# Patient Record
Sex: MALE | Race: WHITE | NOT HISPANIC OR LATINO | Employment: FULL TIME | URBAN - METROPOLITAN AREA
[De-identification: names, ages, dates, MRNs, and addresses within clinical notes are randomized per-mention and may not be internally consistent; named-entity substitution may affect disease eponyms.]

---

## 2023-02-15 PROBLEM — K21.9 GERD (GASTROESOPHAGEAL REFLUX DISEASE): Status: ACTIVE | Noted: 2023-02-15

## 2023-02-15 PROBLEM — R73.01 ELEVATED FASTING GLUCOSE: Status: ACTIVE | Noted: 2023-02-15

## 2023-02-15 PROBLEM — K57.90 DIVERTICULOSIS: Status: ACTIVE | Noted: 2023-02-15

## 2023-02-15 PROBLEM — K22.10 EROSIVE ESOPHAGITIS: Status: ACTIVE | Noted: 2023-02-15

## 2023-02-15 PROBLEM — E78.5 HYPERLIPIDEMIA: Status: ACTIVE | Noted: 2023-02-15

## 2023-02-15 PROBLEM — H90.3 ASYMMETRIC SNHL (SENSORINEURAL HEARING LOSS): Status: ACTIVE | Noted: 2023-02-15

## 2023-02-15 PROBLEM — H81.09 MENIERE SYNDROME: Status: ACTIVE | Noted: 2023-02-15

## 2023-02-15 PROBLEM — H91.10 PRESBYCUSIS: Status: ACTIVE | Noted: 2023-02-15

## 2023-02-15 PROBLEM — R79.9 ELEVATED BUN: Status: ACTIVE | Noted: 2023-02-15

## 2023-02-15 PROBLEM — K22.70 BARRETT'S ESOPHAGUS: Status: ACTIVE | Noted: 2023-02-15

## 2023-02-15 PROBLEM — H81.10 BENIGN POSITIONAL VERTIGO: Status: ACTIVE | Noted: 2023-02-15

## 2023-02-15 PROBLEM — R06.83 SNORING: Status: ACTIVE | Noted: 2023-02-15

## 2023-02-15 PROBLEM — K63.5 COLON POLYP: Status: ACTIVE | Noted: 2023-02-15

## 2023-02-15 PROBLEM — D12.6 TUBULAR ADENOMA OF COLON: Status: ACTIVE | Noted: 2023-02-15

## 2023-02-15 PROBLEM — R42 VERTIGO: Status: ACTIVE | Noted: 2023-02-15

## 2023-02-15 PROBLEM — E78.1 HYPERTRIGLYCERIDEMIA: Status: ACTIVE | Noted: 2023-02-15

## 2023-02-15 RX ORDER — CIPROFLOXACIN 500 MG/1
TABLET ORAL
COMMUNITY
Start: 2022-10-03 | End: 2023-06-12 | Stop reason: ALTCHOICE

## 2023-02-15 RX ORDER — EPINEPHRINE 0.3 MG/.3ML
0.3 INJECTION SUBCUTANEOUS
COMMUNITY
Start: 2018-04-30 | End: 2023-11-02 | Stop reason: SDUPTHER

## 2023-02-15 RX ORDER — FENOFIBRATE 54 MG/1
1 TABLET ORAL DAILY
COMMUNITY
Start: 2021-10-14 | End: 2023-04-12 | Stop reason: ALTCHOICE

## 2023-02-15 RX ORDER — OMEPRAZOLE 40 MG/1
1 CAPSULE, DELAYED RELEASE ORAL
COMMUNITY
Start: 2019-06-25 | End: 2023-05-03 | Stop reason: ALTCHOICE

## 2023-02-15 RX ORDER — MECLIZINE HYDROCHLORIDE 25 MG/1
TABLET ORAL
COMMUNITY
Start: 2017-11-20 | End: 2023-11-02 | Stop reason: ALTCHOICE

## 2023-02-15 RX ORDER — METRONIDAZOLE 500 MG/1
TABLET ORAL
COMMUNITY
Start: 2022-10-03 | End: 2023-11-02 | Stop reason: ALTCHOICE

## 2023-02-15 RX ORDER — TRIAMTERENE/HYDROCHLOROTHIAZID 37.5-25 MG
1 TABLET ORAL DAILY
COMMUNITY
Start: 2020-01-21 | End: 2023-11-08

## 2023-02-15 RX ORDER — ZOSTER VACCINE RECOMBINANT, ADJUVANTED 50 MCG/0.5
KIT INTRAMUSCULAR
COMMUNITY
Start: 2021-09-16 | End: 2023-11-02 | Stop reason: ALTCHOICE

## 2023-02-15 RX ORDER — ATORVASTATIN CALCIUM 40 MG/1
80 TABLET, FILM COATED ORAL DAILY
COMMUNITY
Start: 2017-10-23 | End: 2023-06-12 | Stop reason: ALTCHOICE

## 2023-02-20 RX ORDER — MECLIZINE HYDROCHLORIDE 25 MG/1
TABLET ORAL
COMMUNITY
End: 2023-11-02 | Stop reason: ALTCHOICE

## 2023-02-20 RX ORDER — OMEPRAZOLE 40 MG/1
40 CAPSULE, DELAYED RELEASE ORAL
COMMUNITY
End: 2023-05-03 | Stop reason: ALTCHOICE

## 2023-02-24 LAB
ANION GAP IN SER/PLAS: 16 MMOL/L (ref 10–20)
APPEARANCE, URINE: CLEAR
BILIRUBIN, URINE: NEGATIVE
BLOOD, URINE: NEGATIVE
CALCIDIOL (25 OH VITAMIN D3) (NG/ML) IN SER/PLAS: 32 NG/ML
CALCIUM (MG/DL) IN SER/PLAS: 10.1 MG/DL (ref 8.6–10.3)
CARBON DIOXIDE, TOTAL (MMOL/L) IN SER/PLAS: 29 MMOL/L (ref 21–32)
CHLORIDE (MMOL/L) IN SER/PLAS: 97 MMOL/L (ref 98–107)
COLOR, URINE: YELLOW
CREATININE (MG/DL) IN SER/PLAS: 1.5 MG/DL (ref 0.5–1.3)
CREATININE (MG/DL) IN URINE: 174 MG/DL (ref 20–370)
GFR MALE: 52 ML/MIN/1.73M2
GLUCOSE (MG/DL) IN SER/PLAS: 102 MG/DL (ref 74–99)
GLUCOSE, URINE: NEGATIVE MG/DL
KETONES, URINE: NEGATIVE MG/DL
LEUKOCYTE ESTERASE, URINE: NEGATIVE
NITRITE, URINE: NEGATIVE
PARATHYRIN INTACT (PG/ML) IN SER/PLAS: 47.6 PG/ML (ref 18.5–88)
PH, URINE: 5 (ref 5–8)
PHOSPHATE (MG/DL) IN SER/PLAS: 3.2 MG/DL (ref 2.5–4.9)
POTASSIUM (MMOL/L) IN SER/PLAS: 3.9 MMOL/L (ref 3.5–5.3)
PROTEIN (MG/DL) IN URINE: 17 MG/DL (ref 5–25)
PROTEIN, URINE: NEGATIVE MG/DL
PROTEIN/CREATININE (MG/MG) IN URINE: 0.1 MG/MG CREAT (ref 0–0.17)
SODIUM (MMOL/L) IN SER/PLAS: 138 MMOL/L (ref 136–145)
SPECIFIC GRAVITY, URINE: 1.02 (ref 1–1.03)
URATE (MG/DL) IN SER/PLAS: 8.1 MG/DL (ref 4–7.5)
UREA NITROGEN (MG/DL) IN SER/PLAS: 25 MG/DL (ref 6–23)
UROBILINOGEN, URINE: <2 MG/DL (ref 0–1.9)

## 2023-04-12 ENCOUNTER — OFFICE VISIT (OUTPATIENT)
Dept: PRIMARY CARE | Facility: CLINIC | Age: 63
End: 2023-04-12
Payer: COMMERCIAL

## 2023-04-12 VITALS
HEIGHT: 72 IN | BODY MASS INDEX: 20.34 KG/M2 | RESPIRATION RATE: 16 BRPM | WEIGHT: 150.2 LBS | HEART RATE: 72 BPM | SYSTOLIC BLOOD PRESSURE: 112 MMHG | DIASTOLIC BLOOD PRESSURE: 68 MMHG | TEMPERATURE: 97.8 F | OXYGEN SATURATION: 98 %

## 2023-04-12 DIAGNOSIS — E78.2 MIXED HYPERLIPIDEMIA: ICD-10-CM

## 2023-04-12 DIAGNOSIS — K22.10 EROSIVE ESOPHAGITIS: ICD-10-CM

## 2023-04-12 DIAGNOSIS — N18.31 CHRONIC RENAL FAILURE, STAGE 3A (MULTI): ICD-10-CM

## 2023-04-12 DIAGNOSIS — K21.9 GASTROESOPHAGEAL REFLUX DISEASE WITHOUT ESOPHAGITIS: ICD-10-CM

## 2023-04-12 DIAGNOSIS — G47.33 OBSTRUCTIVE SLEEP APNEA, ADULT: ICD-10-CM

## 2023-04-12 DIAGNOSIS — E79.0 HYPERURICEMIA: Primary | ICD-10-CM

## 2023-04-12 PROBLEM — M22.41 PATELLOFEMORAL CHONDROSIS, RIGHT: Status: ACTIVE | Noted: 2018-04-05

## 2023-04-12 PROBLEM — E78.6 ELEVATED RATIO OF CHOLESTEROL TO HIGH DENSITY LIPOPROTEIN (HDL): Status: RESOLVED | Noted: 2023-04-12 | Resolved: 2023-04-12

## 2023-04-12 PROBLEM — M47.816 LUMBAR SPONDYLOSIS: Status: ACTIVE | Noted: 2021-05-25

## 2023-04-12 PROBLEM — R42 VERTIGO: Status: RESOLVED | Noted: 2023-02-15 | Resolved: 2023-04-12

## 2023-04-12 PROBLEM — E78.6 ELEVATED RATIO OF CHOLESTEROL TO HIGH DENSITY LIPOPROTEIN (HDL): Status: ACTIVE | Noted: 2023-04-12

## 2023-04-12 PROBLEM — M47.816 LUMBAR SPONDYLOSIS: Status: RESOLVED | Noted: 2021-05-25 | Resolved: 2023-04-12

## 2023-04-12 PROBLEM — R06.83 SNORING: Status: RESOLVED | Noted: 2023-02-15 | Resolved: 2023-04-12

## 2023-04-12 PROBLEM — R79.9 ELEVATED BUN: Status: RESOLVED | Noted: 2023-02-15 | Resolved: 2023-04-12

## 2023-04-12 PROBLEM — M19.91 LOCALIZED, PRIMARY OSTEOARTHRITIS: Status: ACTIVE | Noted: 2021-05-25

## 2023-04-12 PROCEDURE — 1036F TOBACCO NON-USER: CPT | Performed by: INTERNAL MEDICINE

## 2023-04-12 PROCEDURE — 99214 OFFICE O/P EST MOD 30 MIN: CPT | Performed by: INTERNAL MEDICINE

## 2023-04-12 RX ORDER — ALLOPURINOL 100 MG/1
1 TABLET ORAL DAILY
COMMUNITY
Start: 2023-02-27 | End: 2024-02-27

## 2023-04-12 RX ORDER — UBIDECARENONE 30 MG
30 CAPSULE ORAL DAILY
Qty: 30 CAPSULE | Refills: 11 | Status: SHIPPED | OUTPATIENT
Start: 2023-04-12 | End: 2024-04-11

## 2023-04-12 ASSESSMENT — ENCOUNTER SYMPTOMS
CONSTIPATION: 0
DYSPHORIC MOOD: 0
PALPITATIONS: 0
ARTHRALGIAS: 0
CHEST TIGHTNESS: 0
VOMITING: 0
POLYDIPSIA: 0
SHORTNESS OF BREATH: 0
COUGH: 0
DIARRHEA: 0
FEVER: 0
UNEXPECTED WEIGHT CHANGE: 0
DIZZINESS: 0
NAUSEA: 0
NECK PAIN: 0
FREQUENCY: 0
HEADACHES: 0

## 2023-04-12 ASSESSMENT — PAIN SCALES - GENERAL: PAINLEVEL: 0-NO PAIN

## 2023-04-12 NOTE — PROGRESS NOTES
Anca Kowalski is a 62 y.o. male who presents for Follow-up (Review labs ).    Patient wants to discuss the dosage change of the atorvastatin, states that he is experiencing side effects    Feels better than ever.  Seen by Dr. Lynn and managing CKD.    Thinks statin is killing his Mojo since increasing his statin.        Review of Systems   Constitutional:  Negative for fever and unexpected weight change.   HENT:  Negative for congestion and ear pain.    Eyes:  Negative for visual disturbance.   Respiratory:  Negative for cough, chest tightness and shortness of breath.    Cardiovascular:  Negative for chest pain, palpitations and leg swelling.   Gastrointestinal:  Negative for constipation, diarrhea, nausea and vomiting.   Endocrine: Negative for polydipsia and polyuria.   Genitourinary:  Negative for frequency and urgency.   Musculoskeletal:  Negative for arthralgias and neck pain.   Skin:  Negative for rash.   Neurological:  Negative for dizziness and headaches.   Psychiatric/Behavioral:  Negative for dysphoric mood.    All other systems reviewed and are negative.      Objective   /68   Pulse 72   Temp 36.6 °C (97.8 °F)   Resp 16   Ht 1.829 m (6')   Wt 68.1 kg (150 lb 3.2 oz)   SpO2 98%   BMI 20.37 kg/m²       Physical Exam  Constitutional:       Appearance: Normal appearance.   HENT:      Head: Normocephalic.   Eyes:      Conjunctiva/sclera: Conjunctivae normal.   Cardiovascular:      Rate and Rhythm: Normal rate and regular rhythm.   Pulmonary:      Effort: Pulmonary effort is normal.      Breath sounds: Normal breath sounds.   Musculoskeletal:      Cervical back: Neck supple.   Skin:     General: Skin is warm and dry.   Neurological:      Mental Status: He is alert.         Assessment/Plan   Problem List Items Addressed This Visit          Nervous    Obstructive sleep apnea, adult    Relevant Medications    co-enzyme Q-10 30 mg capsule    Other Relevant Orders    Referral to  Cardiology       Digestive    Erosive esophagitis    Relevant Medications    co-enzyme Q-10 30 mg capsule    Other Relevant Orders    Referral to Cardiology    GERD (gastroesophageal reflux disease)    Relevant Medications    co-enzyme Q-10 30 mg capsule    Other Relevant Orders    Referral to Cardiology       Genitourinary    Chronic renal failure, stage 3a    Relevant Medications    co-enzyme Q-10 30 mg capsule    Other Relevant Orders    Referral to Cardiology       Other    Hyperlipidemia    Relevant Medications    co-enzyme Q-10 30 mg capsule    Other Relevant Orders    Referral to Cardiology    Hyperuricemia - Primary    Relevant Medications    co-enzyme Q-10 30 mg capsule    Other Relevant Orders    Referral to Cardiology     Encounter Diagnoses   Name Primary?    Hyperuricemia Yes    Mixed hyperlipidemia     Chronic renal failure, stage 3a     Gastroesophageal reflux disease without esophagitis     Erosive esophagitis     Obstructive sleep apnea, adult      Henry Steve, DO

## 2023-07-07 ENCOUNTER — LAB (OUTPATIENT)
Dept: LAB | Facility: LAB | Age: 63
End: 2023-07-07
Payer: COMMERCIAL

## 2023-07-07 DIAGNOSIS — E78.2 MIXED HYPERLIPIDEMIA: ICD-10-CM

## 2023-07-07 LAB
CHOLESTEROL (MG/DL) IN SER/PLAS: 119 MG/DL (ref 0–199)
CHOLESTEROL IN HDL (MG/DL) IN SER/PLAS: 33.1 MG/DL
CHOLESTEROL/HDL RATIO: 3.6
LDL: 65 MG/DL (ref 0–99)
TRIGLYCERIDE (MG/DL) IN SER/PLAS: 103 MG/DL (ref 0–149)
VLDL: 21 MG/DL (ref 0–40)

## 2023-07-07 PROCEDURE — 36415 COLL VENOUS BLD VENIPUNCTURE: CPT

## 2023-07-07 PROCEDURE — 80061 LIPID PANEL: CPT

## 2023-07-10 ENCOUNTER — OFFICE VISIT (OUTPATIENT)
Dept: PRIMARY CARE | Facility: CLINIC | Age: 63
End: 2023-07-10
Payer: COMMERCIAL

## 2023-07-10 VITALS
RESPIRATION RATE: 16 BRPM | SYSTOLIC BLOOD PRESSURE: 132 MMHG | DIASTOLIC BLOOD PRESSURE: 74 MMHG | BODY MASS INDEX: 26.17 KG/M2 | WEIGHT: 193.2 LBS | HEART RATE: 94 BPM | TEMPERATURE: 98 F | OXYGEN SATURATION: 96 % | HEIGHT: 72 IN

## 2023-07-10 DIAGNOSIS — I10 ESSENTIAL HYPERTENSION: ICD-10-CM

## 2023-07-10 DIAGNOSIS — G47.33 OBSTRUCTIVE SLEEP APNEA, ADULT: ICD-10-CM

## 2023-07-10 DIAGNOSIS — N18.31 CHRONIC RENAL FAILURE, STAGE 3A (MULTI): ICD-10-CM

## 2023-07-10 DIAGNOSIS — Z12.5 ENCOUNTER FOR PROSTATE CANCER SCREENING: ICD-10-CM

## 2023-07-10 DIAGNOSIS — E78.2 MIXED HYPERLIPIDEMIA: Primary | ICD-10-CM

## 2023-07-10 DIAGNOSIS — Z00.00 WELL ADULT HEALTH CHECK: ICD-10-CM

## 2023-07-10 DIAGNOSIS — K22.70 BARRETT'S ESOPHAGUS WITHOUT DYSPLASIA: ICD-10-CM

## 2023-07-10 DIAGNOSIS — E78.1 HYPERTRIGLYCERIDEMIA: ICD-10-CM

## 2023-07-10 PROBLEM — M1A.0710 IDIOPATHIC CHRONIC GOUT OF RIGHT FOOT WITHOUT TOPHUS: Status: ACTIVE | Noted: 2023-07-10

## 2023-07-10 PROBLEM — I49.3 PREMATURE VENTRICULAR CONTRACTIONS: Status: ACTIVE | Noted: 2023-07-10

## 2023-07-10 PROBLEM — I25.10 CAD (CORONARY ARTERY DISEASE): Status: ACTIVE | Noted: 2023-07-10

## 2023-07-10 PROBLEM — E79.0 ELEVATED URIC ACID IN BLOOD: Status: RESOLVED | Noted: 2023-04-12 | Resolved: 2023-07-10

## 2023-07-10 PROCEDURE — 3075F SYST BP GE 130 - 139MM HG: CPT | Performed by: INTERNAL MEDICINE

## 2023-07-10 PROCEDURE — 99213 OFFICE O/P EST LOW 20 MIN: CPT | Performed by: INTERNAL MEDICINE

## 2023-07-10 PROCEDURE — 1036F TOBACCO NON-USER: CPT | Performed by: INTERNAL MEDICINE

## 2023-07-10 PROCEDURE — 3078F DIAST BP <80 MM HG: CPT | Performed by: INTERNAL MEDICINE

## 2023-07-10 RX ORDER — RAMIPRIL 1.25 MG/1
1 CAPSULE ORAL DAILY
COMMUNITY
Start: 2023-05-19 | End: 2024-02-28

## 2023-07-10 ASSESSMENT — PAIN SCALES - GENERAL: PAINLEVEL: 0-NO PAIN

## 2023-07-10 NOTE — PROGRESS NOTES
Anca Kowalski is a 63 y.o. male who presents for Follow-up (Review lab and hypertension ) and stress test   Patient has no concerns today     Patient had stressed test down and was told it was normal   Reviewed Dr. Light recommendations and is exercising, and eating healthier.  16,000 steps per day.  Seeing Nephrology and Cardiology.        Review of Systems   All other systems reviewed and are negative.      Objective   /74   Pulse 94   Temp 36.7 °C (98 °F)   Resp 16   Ht 1.829 m (6')   Wt 87.6 kg (193 lb 3.2 oz)   SpO2 96%   BMI 26.20 kg/m²       Physical Exam  Constitutional:       Appearance: Normal appearance.   HENT:      Head: Normocephalic.   Eyes:      Conjunctiva/sclera: Conjunctivae normal.   Cardiovascular:      Rate and Rhythm: Normal rate and regular rhythm.   Pulmonary:      Effort: Pulmonary effort is normal.      Breath sounds: Normal breath sounds.   Musculoskeletal:      Cervical back: Neck supple.   Skin:     General: Skin is warm and dry.   Neurological:      Mental Status: He is alert.         Assessment/Plan   Problem List Items Addressed This Visit       Ennis's esophagus    Hyperlipidemia - Primary    Relevant Orders    TSH with reflex to Free T4 if abnormal    Hypertriglyceridemia    Chronic renal failure, stage 3a    Obstructive sleep apnea, adult     Nasal CPAP and tolerating well.         Relevant Orders    TSH with reflex to Free T4 if abnormal    Essential hypertension    Relevant Orders    TSH with reflex to Free T4 if abnormal     Other Visit Diagnoses       Encounter for prostate cancer screening        Relevant Orders    Prostate Specific Antigen    Well adult health check        Relevant Orders    Comprehensive Metabolic Panel    CBC    TSH with reflex to Free T4 if abnormal          Encounter Diagnoses   Name Primary?    Mixed hyperlipidemia Yes    Hypertriglyceridemia     Essential hypertension     Ennis's esophagus without dysplasia     Chronic  renal failure, stage 3a     Obstructive sleep apnea, adult     Encounter for prostate cancer screening     Well adult health check      Henry Steve DO

## 2023-07-19 ENCOUNTER — APPOINTMENT (OUTPATIENT)
Dept: PRIMARY CARE | Facility: CLINIC | Age: 63
End: 2023-07-19
Payer: COMMERCIAL

## 2023-08-25 PROBLEM — N18.9 CKD (CHRONIC KIDNEY DISEASE): Status: ACTIVE | Noted: 2023-08-25

## 2023-08-25 PROBLEM — Z91.030 BEE STING ALLERGY: Status: ACTIVE | Noted: 2023-08-25

## 2023-08-25 PROBLEM — K57.32 DIVERTICULITIS, COLON: Status: ACTIVE | Noted: 2023-08-25

## 2023-08-25 PROBLEM — I47.10 SUPRAVENTRICULAR TACHYCARDIA (CMS-HCC): Status: ACTIVE | Noted: 2023-08-25

## 2023-08-25 PROBLEM — K62.5 RECTAL BLEEDING: Status: ACTIVE | Noted: 2023-08-25

## 2023-08-25 PROBLEM — M79.671 RIGHT FOOT PAIN: Status: ACTIVE | Noted: 2023-08-25

## 2023-08-25 PROBLEM — Z91.89 10 YEAR RISK OF MI OR STROKE 7.5% OR GREATER: Status: ACTIVE | Noted: 2023-08-25

## 2023-08-25 RX ORDER — PSYLLIUM SEED
PACKET (EA) ORAL
COMMUNITY
End: 2024-04-24 | Stop reason: WASHOUT

## 2023-09-07 DIAGNOSIS — E78.2 MIXED HYPERLIPIDEMIA: ICD-10-CM

## 2023-09-07 RX ORDER — ATORVASTATIN CALCIUM 80 MG/1
TABLET, FILM COATED ORAL
Qty: 90 TABLET | Refills: 3 | Status: SHIPPED | OUTPATIENT
Start: 2023-09-07

## 2023-09-16 LAB
ANION GAP IN SER/PLAS: 14 MMOL/L (ref 10–20)
CALCIDIOL (25 OH VITAMIN D3) (NG/ML) IN SER/PLAS: 28 NG/ML
CALCIUM (MG/DL) IN SER/PLAS: 9.5 MG/DL (ref 8.6–10.3)
CARBON DIOXIDE, TOTAL (MMOL/L) IN SER/PLAS: 26 MMOL/L (ref 21–32)
CHLORIDE (MMOL/L) IN SER/PLAS: 99 MMOL/L (ref 98–107)
CREATININE (MG/DL) IN SER/PLAS: 1.41 MG/DL (ref 0.5–1.3)
GFR MALE: 56 ML/MIN/1.73M2
GLUCOSE (MG/DL) IN SER/PLAS: 89 MG/DL (ref 74–99)
PARATHYRIN INTACT (PG/ML) IN SER/PLAS: 56.3 PG/ML (ref 18.5–88)
PHOSPHATE (MG/DL) IN SER/PLAS: 3.8 MG/DL (ref 2.5–4.9)
POTASSIUM (MMOL/L) IN SER/PLAS: 4.2 MMOL/L (ref 3.5–5.3)
SODIUM (MMOL/L) IN SER/PLAS: 135 MMOL/L (ref 136–145)
URATE (MG/DL) IN SER/PLAS: 7 MG/DL (ref 4–7.5)
UREA NITROGEN (MG/DL) IN SER/PLAS: 37 MG/DL (ref 6–23)

## 2023-10-02 PROBLEM — E55.9 VITAMIN D DEFICIENCY: Status: ACTIVE | Noted: 2023-10-02

## 2023-10-02 RX ORDER — ERGOCALCIFEROL 1.25 MG/1
1 CAPSULE ORAL
COMMUNITY
Start: 2023-09-18

## 2023-10-04 ENCOUNTER — OFFICE VISIT (OUTPATIENT)
Dept: OTOLARYNGOLOGY | Facility: CLINIC | Age: 63
End: 2023-10-04
Payer: COMMERCIAL

## 2023-10-04 ENCOUNTER — CLINICAL SUPPORT (OUTPATIENT)
Dept: AUDIOLOGY | Facility: CLINIC | Age: 63
End: 2023-10-04
Payer: COMMERCIAL

## 2023-10-04 VITALS — BODY MASS INDEX: 25.05 KG/M2 | HEIGHT: 73 IN | WEIGHT: 189 LBS

## 2023-10-04 DIAGNOSIS — H90.3 ASYMMETRIC SNHL (SENSORINEURAL HEARING LOSS): Primary | ICD-10-CM

## 2023-10-04 DIAGNOSIS — H81.02 MENIERE'S DISEASE OF LEFT EAR: Primary | ICD-10-CM

## 2023-10-04 DIAGNOSIS — H90.42 SENSORINEURAL HEARING LOSS (SNHL) OF LEFT EAR WITH UNRESTRICTED HEARING OF RIGHT EAR: ICD-10-CM

## 2023-10-04 DIAGNOSIS — H81.02 MENIERE'S DISEASE OF LEFT EAR: ICD-10-CM

## 2023-10-04 PROCEDURE — 92567 TYMPANOMETRY: CPT | Performed by: AUDIOLOGIST

## 2023-10-04 PROCEDURE — 99213 OFFICE O/P EST LOW 20 MIN: CPT | Performed by: OTOLARYNGOLOGY

## 2023-10-04 PROCEDURE — 1036F TOBACCO NON-USER: CPT | Performed by: OTOLARYNGOLOGY

## 2023-10-04 PROCEDURE — 92557 COMPREHENSIVE HEARING TEST: CPT | Performed by: AUDIOLOGIST

## 2023-10-04 NOTE — PROGRESS NOTES
The patient returns.  We are seeing him back today surveillance recheck history of Ménière's disease.  He has only had 1 slight episode of vertigo which he relates to eating too much salt.  He has been evaluated by cardiology as well as renal.  He has been told to eat no salt whatsoever.  He is otherwise doing great.  He does think his hearing is further declining in that left ear.    There have been no significant changes in past medical or past surgical histories except as mentioned.      Physical exam:  No acute distress.  The external ear structures appear normal. The ear canals patent and the tympanic membranes are intact without evidence of air-fluid levels, retraction, or congenital defects.  Anterior rhinoscopy notes essentially a midline nasal septum. Examination is noted for normal healthy mucosal membranes without any evidence of lesions, polyps, or exudate. The tongue is normally mobile. There are no lesions on the gingiva, buccal, or oral mucosa. There are no oral cavity masses.  The neck is negative for mass lymphadenopathy. The trachea and parotid are clear. The thyroid bed is grossly unremarkable. The salivary gland structures are grossly unremarkable.    Audiogram: Stable low-frequency loss down to 35 dB left with associated high-frequency loss left ear only down to 60 dB.  This is stable again.    Assessment and plan:  1.  History of Ménière's disease overall stable.  We have agreed to continue the diuretic for now.  He already has at prescription provided for him.  Recheck in 1 year sooner with issue.  2.  Overall stable unilateral hearing loss to the left ear as described.  Favor observation.  May consider amplification strategies at his leisure.  All questions were answered in this regard accordingly.

## 2023-10-04 NOTE — PROGRESS NOTES
Mr. Kowalski returned today for a hearing evaluation in conjunction with his follow up with Dr. Martinez.  The patient has a history of Meniere's disease and hearing loss left greater than right.  He has been doing quite well symptomatically with appropriate accommodations to his diet and only experiences more mild Meniere's flare ups 1-2 per year.  He did report possible concern for declined hearing with an echo sensation in his left ear.    Results:  Otoscopy revealed clear ear canals and tympanic membranes were visualized bilaterally.  Tympanometry revealed Type A sub d tympanograms, indicating normal ear canal volume and peak pressure with increased compliance/mobility bilaterally.  Audiometric thresholds revealed a a slight to mild sensorineural hearing loss in his right ear and a mild sloping to moderate sensorineural hearing loss in his left ear.  Word recognition scores excellent bilaterally.  Hearing levels have remained essentially stable as compared to his last evaluation in 2021.    Recommendations:  Follow-up with PCP, Dr. Steve, as medically directed.  Follow-up with ENT, Dr. Martinez, as medically directed.  Consider amplification for left ear.  Retest hearing annually to monitor or sooner should concerns for a change arise.

## 2023-11-01 ENCOUNTER — LAB (OUTPATIENT)
Dept: LAB | Facility: LAB | Age: 63
End: 2023-11-01
Payer: COMMERCIAL

## 2023-11-01 DIAGNOSIS — Z00.00 WELL ADULT HEALTH CHECK: ICD-10-CM

## 2023-11-01 DIAGNOSIS — E78.2 MIXED HYPERLIPIDEMIA: ICD-10-CM

## 2023-11-01 DIAGNOSIS — I10 ESSENTIAL HYPERTENSION: ICD-10-CM

## 2023-11-01 DIAGNOSIS — Z12.5 ENCOUNTER FOR PROSTATE CANCER SCREENING: ICD-10-CM

## 2023-11-01 DIAGNOSIS — G47.33 OBSTRUCTIVE SLEEP APNEA, ADULT: ICD-10-CM

## 2023-11-01 LAB
ALBUMIN SERPL BCP-MCNC: 3.8 G/DL (ref 3.4–5)
ALP SERPL-CCNC: 80 U/L (ref 33–136)
ALT SERPL W P-5'-P-CCNC: 21 U/L (ref 10–52)
ANION GAP SERPL CALC-SCNC: 12 MMOL/L (ref 10–20)
AST SERPL W P-5'-P-CCNC: 25 U/L (ref 9–39)
BILIRUB SERPL-MCNC: 0.4 MG/DL (ref 0–1.2)
BUN SERPL-MCNC: 17 MG/DL (ref 6–23)
CALCIUM SERPL-MCNC: 8.9 MG/DL (ref 8.6–10.3)
CHLORIDE SERPL-SCNC: 102 MMOL/L (ref 98–107)
CO2 SERPL-SCNC: 28 MMOL/L (ref 21–32)
CREAT SERPL-MCNC: 1.48 MG/DL (ref 0.5–1.3)
ERYTHROCYTE [DISTWIDTH] IN BLOOD BY AUTOMATED COUNT: 13.3 % (ref 11.5–14.5)
GFR SERPL CREATININE-BSD FRML MDRD: 53 ML/MIN/1.73M*2
GLUCOSE SERPL-MCNC: 115 MG/DL (ref 74–99)
HCT VFR BLD AUTO: 36.8 % (ref 41–52)
HGB BLD-MCNC: 11.7 G/DL (ref 13.5–17.5)
MCH RBC QN AUTO: 29.4 PG (ref 26–34)
MCHC RBC AUTO-ENTMCNC: 31.8 G/DL (ref 32–36)
MCV RBC AUTO: 93 FL (ref 80–100)
NRBC BLD-RTO: 0 /100 WBCS (ref 0–0)
PLATELET # BLD AUTO: 366 X10*3/UL (ref 150–450)
PMV BLD AUTO: 9.3 FL (ref 7.5–11.5)
POTASSIUM SERPL-SCNC: 4.1 MMOL/L (ref 3.5–5.3)
PROT SERPL-MCNC: 6.5 G/DL (ref 6.4–8.2)
PSA SERPL-MCNC: 1.43 NG/ML
RBC # BLD AUTO: 3.98 X10*6/UL (ref 4.5–5.9)
SODIUM SERPL-SCNC: 138 MMOL/L (ref 136–145)
TSH SERPL-ACNC: 1.24 MIU/L (ref 0.44–3.98)
WBC # BLD AUTO: 8.8 X10*3/UL (ref 4.4–11.3)

## 2023-11-01 PROCEDURE — 85027 COMPLETE CBC AUTOMATED: CPT

## 2023-11-01 PROCEDURE — 36415 COLL VENOUS BLD VENIPUNCTURE: CPT

## 2023-11-01 PROCEDURE — 84153 ASSAY OF PSA TOTAL: CPT

## 2023-11-01 PROCEDURE — 84443 ASSAY THYROID STIM HORMONE: CPT

## 2023-11-01 PROCEDURE — 80053 COMPREHEN METABOLIC PANEL: CPT

## 2023-11-02 ENCOUNTER — OFFICE VISIT (OUTPATIENT)
Dept: PRIMARY CARE | Facility: CLINIC | Age: 63
End: 2023-11-02
Payer: COMMERCIAL

## 2023-11-02 ENCOUNTER — LAB (OUTPATIENT)
Dept: LAB | Facility: LAB | Age: 63
End: 2023-11-02
Payer: COMMERCIAL

## 2023-11-02 ENCOUNTER — TELEPHONE (OUTPATIENT)
Dept: GASTROENTEROLOGY | Facility: CLINIC | Age: 63
End: 2023-11-02

## 2023-11-02 VITALS
TEMPERATURE: 97.8 F | OXYGEN SATURATION: 96 % | HEIGHT: 73 IN | SYSTOLIC BLOOD PRESSURE: 129 MMHG | BODY MASS INDEX: 25.66 KG/M2 | HEART RATE: 83 BPM | RESPIRATION RATE: 16 BRPM | DIASTOLIC BLOOD PRESSURE: 87 MMHG | WEIGHT: 193.6 LBS

## 2023-11-02 DIAGNOSIS — K22.70 BARRETT'S ESOPHAGUS WITHOUT DYSPLASIA: ICD-10-CM

## 2023-11-02 DIAGNOSIS — K62.5 RECTAL BLEEDING: ICD-10-CM

## 2023-11-02 DIAGNOSIS — K57.90 DIVERTICULOSIS: ICD-10-CM

## 2023-11-02 DIAGNOSIS — E55.9 VITAMIN D DEFICIENCY: ICD-10-CM

## 2023-11-02 DIAGNOSIS — E78.2 MIXED HYPERLIPIDEMIA: ICD-10-CM

## 2023-11-02 DIAGNOSIS — N18.2 STAGE 2 CHRONIC KIDNEY DISEASE: ICD-10-CM

## 2023-11-02 DIAGNOSIS — K62.5 RECTAL BLEEDING: Primary | ICD-10-CM

## 2023-11-02 DIAGNOSIS — R73.01 ELEVATED FASTING GLUCOSE: ICD-10-CM

## 2023-11-02 DIAGNOSIS — H81.02 MENIERE'S DISEASE OF LEFT EAR: ICD-10-CM

## 2023-11-02 DIAGNOSIS — Z91.030 BEE STING ALLERGY: ICD-10-CM

## 2023-11-02 DIAGNOSIS — G47.33 OBSTRUCTIVE SLEEP APNEA, ADULT: ICD-10-CM

## 2023-11-02 DIAGNOSIS — N18.31 CHRONIC RENAL FAILURE, STAGE 3A (MULTI): ICD-10-CM

## 2023-11-02 DIAGNOSIS — I10 ESSENTIAL HYPERTENSION: ICD-10-CM

## 2023-11-02 DIAGNOSIS — K21.9 GASTROESOPHAGEAL REFLUX DISEASE WITHOUT ESOPHAGITIS: ICD-10-CM

## 2023-11-02 DIAGNOSIS — D64.9 ANEMIA, UNSPECIFIED TYPE: ICD-10-CM

## 2023-11-02 PROBLEM — N18.9 CKD (CHRONIC KIDNEY DISEASE): Status: RESOLVED | Noted: 2023-08-25 | Resolved: 2023-11-02

## 2023-11-02 PROBLEM — M19.91 LOCALIZED, PRIMARY OSTEOARTHRITIS: Status: RESOLVED | Noted: 2021-05-25 | Resolved: 2023-11-02

## 2023-11-02 PROBLEM — I49.3 PREMATURE VENTRICULAR CONTRACTIONS: Status: RESOLVED | Noted: 2023-07-10 | Resolved: 2023-11-02

## 2023-11-02 PROBLEM — K57.32 DIVERTICULITIS, COLON: Status: RESOLVED | Noted: 2023-08-25 | Resolved: 2023-11-02

## 2023-11-02 PROBLEM — I25.10 CAD (CORONARY ARTERY DISEASE): Status: RESOLVED | Noted: 2023-07-10 | Resolved: 2023-11-02

## 2023-11-02 PROBLEM — I47.10 SUPRAVENTRICULAR TACHYCARDIA (CMS-HCC): Status: RESOLVED | Noted: 2023-08-25 | Resolved: 2023-11-02

## 2023-11-02 PROBLEM — M79.671 RIGHT FOOT PAIN: Status: RESOLVED | Noted: 2023-08-25 | Resolved: 2023-11-02

## 2023-11-02 PROBLEM — M1A.0710 IDIOPATHIC CHRONIC GOUT OF RIGHT FOOT WITHOUT TOPHUS: Status: RESOLVED | Noted: 2023-07-10 | Resolved: 2023-11-02

## 2023-11-02 PROBLEM — H81.10 BENIGN POSITIONAL VERTIGO: Status: RESOLVED | Noted: 2023-02-15 | Resolved: 2023-11-02

## 2023-11-02 PROBLEM — M22.41 PATELLOFEMORAL CHONDROSIS, RIGHT: Status: RESOLVED | Noted: 2018-04-05 | Resolved: 2023-11-02

## 2023-11-02 PROBLEM — H91.10 PRESBYCUSIS: Status: RESOLVED | Noted: 2023-02-15 | Resolved: 2023-11-02

## 2023-11-02 LAB
CRP SERPL-MCNC: 1.82 MG/DL
FOLATE SERPL-MCNC: 16.8 NG/ML
HGB RETIC QN: 32 PG (ref 28–38)
IMMATURE RETIC FRACTION: 16.5 %
IRON SATN MFR SERPL: 17 % (ref 25–45)
IRON SERPL-MCNC: 57 UG/DL (ref 35–150)
POC HEMOGLOBIN A1C: 5.5 % (ref 4.2–6.5)
RETICS #: 0.09 X10*6/UL (ref 0.02–0.12)
RETICS/RBC NFR AUTO: 2.3 % (ref 0.5–2)
TIBC SERPL-MCNC: 326 UG/DL (ref 240–445)
UIBC SERPL-MCNC: 269 UG/DL (ref 110–370)
VIT B12 SERPL-MCNC: 855 PG/ML (ref 211–911)

## 2023-11-02 PROCEDURE — 85045 AUTOMATED RETICULOCYTE COUNT: CPT

## 2023-11-02 PROCEDURE — 86140 C-REACTIVE PROTEIN: CPT

## 2023-11-02 PROCEDURE — 3079F DIAST BP 80-89 MM HG: CPT | Performed by: INTERNAL MEDICINE

## 2023-11-02 PROCEDURE — 83550 IRON BINDING TEST: CPT

## 2023-11-02 PROCEDURE — 36415 COLL VENOUS BLD VENIPUNCTURE: CPT

## 2023-11-02 PROCEDURE — 1036F TOBACCO NON-USER: CPT | Performed by: INTERNAL MEDICINE

## 2023-11-02 PROCEDURE — 82607 VITAMIN B-12: CPT

## 2023-11-02 PROCEDURE — 82728 ASSAY OF FERRITIN: CPT

## 2023-11-02 PROCEDURE — 83036 HEMOGLOBIN GLYCOSYLATED A1C: CPT | Performed by: INTERNAL MEDICINE

## 2023-11-02 PROCEDURE — 99396 PREV VISIT EST AGE 40-64: CPT | Performed by: INTERNAL MEDICINE

## 2023-11-02 PROCEDURE — 82746 ASSAY OF FOLIC ACID SERUM: CPT

## 2023-11-02 PROCEDURE — 3074F SYST BP LT 130 MM HG: CPT | Performed by: INTERNAL MEDICINE

## 2023-11-02 PROCEDURE — 83540 ASSAY OF IRON: CPT

## 2023-11-02 RX ORDER — CHOLECALCIFEROL (VITAMIN D3) 50 MCG
50 TABLET ORAL DAILY
Qty: 30 TABLET | Refills: 11 | Status: SHIPPED | OUTPATIENT
Start: 2023-11-02 | End: 2024-04-24 | Stop reason: WASHOUT

## 2023-11-02 RX ORDER — EPINEPHRINE 0.3 MG/.3ML
0.3 INJECTION SUBCUTANEOUS ONCE AS NEEDED
Qty: 1 EACH | Refills: 1 | Status: SHIPPED | OUTPATIENT
Start: 2023-11-02

## 2023-11-02 RX ORDER — DOCUSATE SODIUM 100 MG/1
100 CAPSULE, LIQUID FILLED ORAL 2 TIMES DAILY
Qty: 60 CAPSULE | Refills: 0 | Status: SHIPPED | OUTPATIENT
Start: 2023-11-02 | End: 2024-04-24 | Stop reason: WASHOUT

## 2023-11-02 ASSESSMENT — PAIN SCALES - GENERAL: PAINLEVEL: 0-NO PAIN

## 2023-11-02 NOTE — PROGRESS NOTES
"Anca Kowalski is a 63 y.o. male who presents for Annual Exam.    Patient already got his yearly flu vaccine   Well Adult Physical   Patient here for a comprehensive physical exam.The patient reports problems - gastro issues first episode was in April and then went away until July, patient had blood and pain- Patient went back to taken his fiber again, then it went away- then traveled to [place] and started getting blood again so started fiber again so it went away again  Patient is still having a little blood from his trip to [place] a couple weeks ago   Patient is very concerned on why this is still continuing to happen and his gastro Dr has not gotten back to him.  GI doctor added fibre supplement 3 x per day and had another episode in July.  Had blood and LLQ pain again.    Called here and went to the ER.      Went to [place] and again in [place] for a [employer] game.  Had taken the cleans both trips due to constipation and bleeding.  Was miserable.      Do you take any herbs or supplements that were not prescribed by a doctor? yes   Are you taking calcium supplements? no   Are you taking aspirin daily? no     History:  Patient receives prostate care outside our clinic  Date last PSA: 11.01.2023  Last colonoscopy was 12.29.2022            Review of Systems    Objective   /87   Pulse 83   Temp 36.6 °C (97.8 °F)   Resp 16   Ht 1.854 m (6' 1\")   Wt 87.8 kg (193 lb 9.6 oz)   SpO2 96%   BMI 25.54 kg/m²       Physical Exam    Assessment/Plan   Problem List Items Addressed This Visit       Diverticulosis    Relevant Medications    docusate sodium (Colace) 100 mg capsule    Elevated fasting glucose    Relevant Orders    POCT glycosylated hemoglobin (Hb A1C) manually resulted (Completed)    Ennis's esophagus    Relevant Orders    C-reactive protein (Completed)    GERD (gastroesophageal reflux disease)    Hyperlipidemia    Meniere syndrome    Chronic renal failure, stage 3a (CMS/HCC)    Relevant " Orders    Reticulocytes (Completed)    Ferritin (Completed)    Iron and TIBC (Completed)    Vitamin B12 (Completed)    Folate (Completed)    Obstructive sleep apnea, adult    Essential hypertension    Bee sting allergy    Relevant Medications    EPINEPHrine 0.3 mg/0.3 mL injection syringe    Other Relevant Orders    Referral to Allergy    RESOLVED: CKD (chronic kidney disease)    Rectal bleeding - Primary    Relevant Orders    POCT glycosylated hemoglobin (Hb A1C) manually resulted (Completed)    C-reactive protein (Completed)    Vitamin D deficiency    Relevant Medications    cholecalciferol (Vitamin D3) 50 MCG (2000 UT) tablet     Other Visit Diagnoses       Anemia, unspecified type              Encounter Diagnoses   Name Primary?    Rectal bleeding Yes    Elevated fasting glucose     Bee sting allergy     Mixed hyperlipidemia     Essential hypertension     Meniere's disease of left ear     Vitamin D deficiency     Gastroesophageal reflux disease without esophagitis     Ennis's esophagus without dysplasia     Stage 2 chronic kidney disease     Chronic renal failure, stage 3a (CMS/HCC)     Obstructive sleep apnea, adult     Diverticulosis     Anemia, unspecified type      Henry Steve DO

## 2023-11-03 LAB — FERRITIN SERPL-MCNC: 139 NG/ML (ref 20–300)

## 2023-11-06 DIAGNOSIS — A09 DIARRHEA, INFECTIOUS, ADULT: Primary | ICD-10-CM

## 2023-11-08 DIAGNOSIS — A09 DIARRHEA OF INFECTIOUS ORIGIN: Primary | ICD-10-CM

## 2023-11-08 RX ORDER — VANCOMYCIN HYDROCHLORIDE 125 MG/1
125 CAPSULE ORAL 4 TIMES DAILY
Qty: 40 CAPSULE | Refills: 0 | Status: SHIPPED | OUTPATIENT
Start: 2023-11-08 | End: 2023-11-18

## 2023-11-08 NOTE — PROGRESS NOTES
"History of Present Illness:   Olayinka Kowalski is a 64yo male with a PMH of HTN, HLD, CKD, MATTHEW, colon polyps, Ennis's esophagus, GERD, and Ménière's disease who presents to clinic for follow-up.  Patient states that he was doing fine until July when he started to develop bloody diarrhea.  It has been progressively getting worse.  Sometimes, his stools will be small and formed, making him think that maybe there was a constipation component.  His PCP did blood work and stool tests.  Blood work showed a mild anemia.  Stool infectious studies were WNL.  Fecal calprotectin was over 3000.  Patient states that he is now using the restroom multiple times a day.  He is fatigued.  He just does not feel well. Weight is stable.     GI visit 5/2023: \"Follow-up of abnormal stools, blood in stools/on toilet paper. Patient states that for the last 7 to 10 days, he has had abnormal stools and blood in the toilet bowl/on the toilet paper. It has progressively gotten worse. He describes the stools as pellet-like and thin. No diarrhea. + Incomplete emptying. He was recently down in [place]. Thus, his daily regimen was different. Different foods and a little bit more alcohol. He denies any abdominal pain. He has had 2 colonoscopies in the last 4 years. Colonoscopy 12/2022: Diverticulosis, SCAD, hemorrhoids.\"     GI visit 11/2022: \"He reports to be feeling much better today after ER visit. No abd pain, nausea/vomiting, diarrhea or constipation. He has normal BM twice daily but did notice blood in his stool for about 1 week after treatment of diverticulitis with abx. However, no further blood in stool for the last 3 weeks. He has also changed his diet, eating lots of grains, less red meat and drinks lots of water. His last colonoscopy 3 years ago that revealed 2mm cecal polyp which was removed.\"     Medical/surgery history: Please see above.  Labs: Reviewed.      Review of Systems  ROS Negative unless otherwise stated above.    Past " Medical/Surgical History  Past Medical History:   Diagnosis Date    Benign positional vertigo 02/15/2023    Diverticulitis, colon 08/25/2023    Meniere's disease, left ear 10/05/2022    Meniere's disease of left ear    Personal history of other benign neoplasm 01/04/2019    History of other benign neoplasm    Personal history of other diseases of the digestive system     History of Ennis's esophagus    Personal history of other diseases of the respiratory system 12/06/2017    History of acute sinusitis    Personal history of other endocrine, nutritional and metabolic disease     History of hyperlipidemia      Past Surgical History:   Procedure Laterality Date    LUMBAR LAMINECTOMY  09/17/2015    Laminectomy Lumbar    OTHER SURGICAL HISTORY  08/30/2022    Appendectomy    OTHER SURGICAL HISTORY  08/30/2022    Knee arthroscopy        Social History   reports that he has quit smoking. His smoking use included cigarettes. He has quit using smokeless tobacco. He reports current alcohol use. He reports that he does not use drugs.     Family History  family history includes CABG in his father; Heart failure in his father; Hypertension in his father; aortic valve replacement in his father; cardiac pacemaker in his father.     Allergies  Allergies   Allergen Reactions    Bee Venom Protein (Honey Bee) Anaphylaxis    Morphine Anaphylaxis    Other Anaphylaxis    Penicillin Anaphylaxis    Penicillins Anaphylaxis    Cephalosporins Swelling       Medications  Current Outpatient Medications   Medication Instructions    allopurinol (Zyloprim) 100 mg tablet 1 tablet, oral, Daily    atorvastatin (Lipitor) 80 mg tablet TAKE 1 TABLET DAILY (INCREASED)    cholecalciferol (VITAMIN D3) 50 mcg, oral, Daily    co-enzyme Q-10 30 mg, oral, Daily    docusate sodium (COLACE) 100 mg, oral, 2 times daily    EPINEPHrine (EPIPEN) 0.3 mg, intramuscular, Once as needed, As Directed    ergocalciferol (Vitamin D-2) 1.25 MG (60347 UT) capsule 1 capsule,  oral, Every 14 days    omeprazole (PriLOSEC) 40 mg DR capsule TAKE 1 CAPSULE EVERY MORNING    psyllium (Metamucil, sugar,) powder oral, Use as directed    ramipril (Altace) 1.25 mg capsule 1 capsule, oral, Daily    triamterene-hydrochlorothiazid (Maxzide-25) 37.5-25 mg tablet TAKE 1 TABLET DAILY (FOLLOW UP 10/13/21)    vancomycin (VANCOCIN) 125 mg, oral, 4 times daily        Objective   Visit Vitals  /90   Pulse 106   Resp 20        General: A&Ox3, NAD.  HEENT: AT/NC.   CV: RRR. No murmur.  Resp: CTA bilaterally. No wheezing, rhonchi or rales.   GI: Soft, NT/ND. BSx4.  Extrem: No edema. Pulses intact.  Skin: No Jaundice.   Neuro: No focal deficits.   Psych: Normal mood and affect.     Assessment/Plan   Olayinka Kowalski is a 64yo male with a PMH of HTN, HLD, CKD, MATTHEW, colon polyps, Ennis's esophagus, GERD, and Ménière's disease who presents to clinic for follow-up.  Patient now with daily bloody diarrhea, cramping (LLQ). + Mild anemia. Fecal calprotectin > 3000.  Stool infectious studies WNL.  This raises the concern that there is now more involved pathology (i.e. inflammatory bowel disease).  We will proceed with colonoscopy tomorrow for further evaluation and to rule out other significant etiologies. Ennis's surveillance due in 10/2024.         David Tapia,

## 2023-11-09 ENCOUNTER — LAB (OUTPATIENT)
Dept: LAB | Facility: LAB | Age: 63
End: 2023-11-09
Payer: COMMERCIAL

## 2023-11-09 DIAGNOSIS — A09 DIARRHEA, INFECTIOUS, ADULT: ICD-10-CM

## 2023-11-09 DIAGNOSIS — A09 DIARRHEA OF INFECTIOUS ORIGIN: ICD-10-CM

## 2023-11-09 LAB — C DIF TOX TCDA+TCDB STL QL NAA+PROBE: NOT DETECTED

## 2023-11-09 PROCEDURE — 87506 IADNA-DNA/RNA PROBE TQ 6-11: CPT

## 2023-11-09 PROCEDURE — 83993 ASSAY FOR CALPROTECTIN FECAL: CPT

## 2023-11-09 PROCEDURE — 82103 ALPHA-1-ANTITRYPSIN TOTAL: CPT

## 2023-11-09 PROCEDURE — 87493 C DIFF AMPLIFIED PROBE: CPT

## 2023-11-10 LAB

## 2023-11-11 LAB — CALPROTECTIN STL-MCNT: >3000 UG/G

## 2023-11-13 ENCOUNTER — OFFICE VISIT (OUTPATIENT)
Dept: GASTROENTEROLOGY | Facility: CLINIC | Age: 63
End: 2023-11-13
Payer: COMMERCIAL

## 2023-11-13 ENCOUNTER — ANESTHESIA EVENT (OUTPATIENT)
Dept: GASTROENTEROLOGY | Facility: EXTERNAL LOCATION | Age: 63
End: 2023-11-13

## 2023-11-13 VITALS
WEIGHT: 190 LBS | RESPIRATION RATE: 20 BRPM | DIASTOLIC BLOOD PRESSURE: 90 MMHG | OXYGEN SATURATION: 98 % | BODY MASS INDEX: 25.07 KG/M2 | SYSTOLIC BLOOD PRESSURE: 164 MMHG | HEART RATE: 106 BPM

## 2023-11-13 DIAGNOSIS — D64.9 ANEMIA, UNSPECIFIED TYPE: ICD-10-CM

## 2023-11-13 DIAGNOSIS — K62.5 RECTAL BLEEDING: Primary | ICD-10-CM

## 2023-11-13 DIAGNOSIS — R19.7 DIARRHEA, UNSPECIFIED TYPE: ICD-10-CM

## 2023-11-13 DIAGNOSIS — R53.83 OTHER FATIGUE: ICD-10-CM

## 2023-11-13 DIAGNOSIS — R19.5 ELEVATED FECAL CALPROTECTIN: ICD-10-CM

## 2023-11-13 LAB — A1AT STL-MCNT: >1.13 MG/G (ref 0–0.5)

## 2023-11-13 PROCEDURE — 99214 OFFICE O/P EST MOD 30 MIN: CPT | Performed by: STUDENT IN AN ORGANIZED HEALTH CARE EDUCATION/TRAINING PROGRAM

## 2023-11-13 PROCEDURE — 3080F DIAST BP >= 90 MM HG: CPT | Performed by: STUDENT IN AN ORGANIZED HEALTH CARE EDUCATION/TRAINING PROGRAM

## 2023-11-13 PROCEDURE — 1036F TOBACCO NON-USER: CPT | Performed by: STUDENT IN AN ORGANIZED HEALTH CARE EDUCATION/TRAINING PROGRAM

## 2023-11-13 PROCEDURE — 3077F SYST BP >= 140 MM HG: CPT | Performed by: STUDENT IN AN ORGANIZED HEALTH CARE EDUCATION/TRAINING PROGRAM

## 2023-11-14 ENCOUNTER — HOSPITAL ENCOUNTER (OUTPATIENT)
Dept: GASTROENTEROLOGY | Facility: EXTERNAL LOCATION | Age: 63
Discharge: HOME | End: 2023-11-14
Payer: COMMERCIAL

## 2023-11-14 ENCOUNTER — LAB (OUTPATIENT)
Dept: LAB | Facility: LAB | Age: 63
End: 2023-11-14
Payer: COMMERCIAL

## 2023-11-14 ENCOUNTER — ANESTHESIA (OUTPATIENT)
Dept: GASTROENTEROLOGY | Facility: EXTERNAL LOCATION | Age: 63
End: 2023-11-14

## 2023-11-14 VITALS
HEART RATE: 83 BPM | RESPIRATION RATE: 18 BRPM | SYSTOLIC BLOOD PRESSURE: 129 MMHG | BODY MASS INDEX: 24.28 KG/M2 | TEMPERATURE: 97.7 F | DIASTOLIC BLOOD PRESSURE: 89 MMHG | WEIGHT: 184 LBS | OXYGEN SATURATION: 98 %

## 2023-11-14 DIAGNOSIS — K52.9 IBD (INFLAMMATORY BOWEL DISEASE): ICD-10-CM

## 2023-11-14 DIAGNOSIS — K52.9 IBD (INFLAMMATORY BOWEL DISEASE): Primary | ICD-10-CM

## 2023-11-14 DIAGNOSIS — K62.5 RECTAL BLEEDING: Primary | ICD-10-CM

## 2023-11-14 LAB
HAV IGM SER QL: NONREACTIVE
HBV CORE IGM SER QL: NONREACTIVE
HBV SURFACE AB SER-ACNC: <3.1 MIU/ML
HBV SURFACE AG SERPL QL IA: NONREACTIVE
HCV AB SER QL: NONREACTIVE

## 2023-11-14 PROCEDURE — 45385 COLONOSCOPY W/LESION REMOVAL: CPT | Performed by: STUDENT IN AN ORGANIZED HEALTH CARE EDUCATION/TRAINING PROGRAM

## 2023-11-14 PROCEDURE — 86481 TB AG RESPONSE T-CELL SUSP: CPT

## 2023-11-14 PROCEDURE — 88305 TISSUE EXAM BY PATHOLOGIST: CPT

## 2023-11-14 PROCEDURE — 88305 TISSUE EXAM BY PATHOLOGIST: CPT | Performed by: PATHOLOGY

## 2023-11-14 PROCEDURE — 45380 COLONOSCOPY AND BIOPSY: CPT | Performed by: STUDENT IN AN ORGANIZED HEALTH CARE EDUCATION/TRAINING PROGRAM

## 2023-11-14 PROCEDURE — 80074 ACUTE HEPATITIS PANEL: CPT

## 2023-11-14 PROCEDURE — 36415 COLL VENOUS BLD VENIPUNCTURE: CPT

## 2023-11-14 PROCEDURE — 86706 HEP B SURFACE ANTIBODY: CPT

## 2023-11-14 RX ORDER — PROPOFOL 10 MG/ML
INJECTION, EMULSION INTRAVENOUS AS NEEDED
Status: DISCONTINUED | OUTPATIENT
Start: 2023-11-14 | End: 2023-11-14

## 2023-11-14 RX ORDER — SODIUM CHLORIDE 9 MG/ML
20 INJECTION, SOLUTION INTRAVENOUS CONTINUOUS
Status: DISCONTINUED | OUTPATIENT
Start: 2023-11-14 | End: 2023-11-15 | Stop reason: HOSPADM

## 2023-11-14 RX ORDER — PREDNISONE 20 MG/1
40 TABLET ORAL DAILY
Qty: 60 TABLET | Refills: 1 | Status: SHIPPED | OUTPATIENT
Start: 2023-11-14 | End: 2023-11-14 | Stop reason: SDUPTHER

## 2023-11-14 RX ORDER — PREDNISONE 20 MG/1
40 TABLET ORAL DAILY
Qty: 60 TABLET | Refills: 1 | Status: SHIPPED | OUTPATIENT
Start: 2023-11-14 | End: 2024-01-09

## 2023-11-14 RX ADMIN — PROPOFOL 50 MG: 10 INJECTION, EMULSION INTRAVENOUS at 11:49

## 2023-11-14 RX ADMIN — PROPOFOL 50 MG: 10 INJECTION, EMULSION INTRAVENOUS at 12:01

## 2023-11-14 RX ADMIN — PROPOFOL 50 MG: 10 INJECTION, EMULSION INTRAVENOUS at 11:55

## 2023-11-14 RX ADMIN — SODIUM CHLORIDE: 9 INJECTION, SOLUTION INTRAVENOUS at 11:41

## 2023-11-14 RX ADMIN — PROPOFOL 50 MG: 10 INJECTION, EMULSION INTRAVENOUS at 12:07

## 2023-11-14 RX ADMIN — PROPOFOL 50 MG: 10 INJECTION, EMULSION INTRAVENOUS at 12:10

## 2023-11-14 RX ADMIN — PROPOFOL 50 MG: 10 INJECTION, EMULSION INTRAVENOUS at 11:44

## 2023-11-14 RX ADMIN — PROPOFOL 150 MG: 10 INJECTION, EMULSION INTRAVENOUS at 11:41

## 2023-11-14 SDOH — HEALTH STABILITY: MENTAL HEALTH: CURRENT SMOKER: 0

## 2023-11-14 ASSESSMENT — PAIN SCALES - GENERAL
PAINLEVEL_OUTOF10: 0 - NO PAIN
PAINLEVEL_OUTOF10: 0 - NO PAIN
PAIN_LEVEL: 0
PAINLEVEL_OUTOF10: 0 - NO PAIN
PAINLEVEL_OUTOF10: 0 - NO PAIN

## 2023-11-14 ASSESSMENT — PAIN - FUNCTIONAL ASSESSMENT
PAIN_FUNCTIONAL_ASSESSMENT: 0-10

## 2023-11-14 ASSESSMENT — COLUMBIA-SUICIDE SEVERITY RATING SCALE - C-SSRS
2. HAVE YOU ACTUALLY HAD ANY THOUGHTS OF KILLING YOURSELF?: NO
6. HAVE YOU EVER DONE ANYTHING, STARTED TO DO ANYTHING, OR PREPARED TO DO ANYTHING TO END YOUR LIFE?: NO
1. IN THE PAST MONTH, HAVE YOU WISHED YOU WERE DEAD OR WISHED YOU COULD GO TO SLEEP AND NOT WAKE UP?: NO

## 2023-11-14 NOTE — H&P
Outpatient Hospital Procedure H&P    Patient Profile  Name Olayinka Kowalski  Date of Birth 1960  MRN [id number]  PCP Robert Buchanan        Diagnosis: Rectal bleeding.  Procedure(s):  Colonoscopy    Allergies  Allergies   Allergen Reactions    Bee Venom Protein (Honey Bee) Anaphylaxis    Morphine Anaphylaxis    Other Anaphylaxis    Penicillin Anaphylaxis    Penicillins Anaphylaxis    Cephalosporins Swelling       Past Medical History   Past Medical History:   Diagnosis Date    Benign positional vertigo 02/15/2023    Diverticulitis, colon 08/25/2023    Meniere's disease, left ear 10/05/2022    Meniere's disease of left ear    Personal history of other benign neoplasm 01/04/2019    History of other benign neoplasm    Personal history of other diseases of the digestive system     History of Ennis's esophagus    Personal history of other diseases of the respiratory system 12/06/2017    History of acute sinusitis    Personal history of other endocrine, nutritional and metabolic disease     History of hyperlipidemia       Medication Reviewed - yes  Prior to Admission medications    Medication Sig Start Date End Date Taking? Authorizing Provider   allopurinol (Zyloprim) 100 mg tablet Take 1 tablet (100 mg) by mouth once daily. 2/27/23  Yes Historical Provider, MD   atorvastatin (Lipitor) 80 mg tablet TAKE 1 TABLET DAILY (INCREASED) 9/7/23  Yes Henry Steve DO   co-enzyme Q-10 30 mg capsule Take 1 capsule (30 mg) by mouth once daily. 4/12/23 4/11/24 Yes Henry Steve DO   ergocalciferol (Vitamin D-2) 1.25 MG (72782 UT) capsule Take 1 capsule (1,250 mcg) by mouth every 14 (fourteen) days. 9/18/23  Yes Historical Provider, MD   omeprazole (PriLOSEC) 40 mg DR capsule TAKE 1 CAPSULE EVERY MORNING 5/3/23  Yes Henry Steve DO   psyllium (Metamucil, sugar,) powder Take by mouth. Use as directed   Yes Historical Provider, MD   ramipril (Altace) 1.25 mg capsule Take 1 capsule (1.25 mg) by mouth once  daily. 5/19/23  Yes Historical Provider, MD   triamterene-hydrochlorothiazid (Maxzide-25) 37.5-25 mg tablet TAKE 1 TABLET DAILY (FOLLOW UP 10/13/21) 11/8/23  Yes Jasbir Garcia PA-C   cholecalciferol (Vitamin D3) 50 MCG (2000 UT) tablet Take 1 tablet (50 mcg) by mouth once daily. 11/2/23 11/1/24  Henry Steve DO   docusate sodium (Colace) 100 mg capsule Take 1 capsule (100 mg) by mouth 2 times a day. 11/2/23   Henry Steve DO   EPINEPHrine 0.3 mg/0.3 mL injection syringe Inject 0.3 mL (0.3 mg) into the muscle 1 time if needed for anaphylaxis for up to 1 dose. As Directed 11/2/23   Henry Steve DO   vancomycin (Vancocin) 125 mg capsule Take 1 capsule (125 mg) by mouth 4 times a day for 10 days. 11/8/23 11/18/23  Henry Steve DO   triamterene-hydrochlorothiazid (Maxzide-25) 37.5-25 mg tablet Take 1 tablet by mouth once daily. 1/21/20 11/8/23  Historical Provider, MD       Physical Exam  Vitals:    11/14/23 1116   BP: 113/82   Pulse: 81   Resp: 11   Temp: 36.9 °C (98.4 °F)   SpO2: 98%      Weight   Vitals:    11/14/23 1116   Weight: 83.5 kg (184 lb)     BMI Body mass index is 24.28 kg/m².    General: A&Ox3, NAD.  HEENT: AT/NC.   CV: RRR. No murmur.  Resp: CTA bilaterally. No wheezing, rhonchi or rales.   GI: Soft, NT/ND. BSx4.  Extrem: No edema. Pulses intact.  Skin: No Jaundice.   Neuro: No focal deficits.   Psych: Normal mood and affect.        Oropharyngeal Classification II (hard and soft palate, upper portion of tonsils anduvula visible)  ASA PS Classification 2  Sedation Plan: Deep Sedation.  Procedure Plan - pre-procedural (re)assesment completed by physician:  discharge/transfer patient when discharge criteria met    David Tapia DO  11/14/2023 11:39 AM

## 2023-11-14 NOTE — ANESTHESIA PREPROCEDURE EVALUATION
Patient: Olayinka Kowalski    Procedure Information       Date/Time: 11/14/23 1050    Scheduled providers: David Tapia DO    Procedure: COLONOSCOPY    Location: [facility]            Relevant Problems   No relevant active problems       Clinical information reviewed:   Tobacco  Allergies  Meds   Med Hx  Surg Hx   Fam Hx  Soc Hx      Vitals:    11/14/23 1116   BP: 113/82   Pulse: 81   Resp: 11   Temp: 36.9 °C (98.4 °F)   SpO2: 98%       Past Surgical History:   Procedure Laterality Date    LUMBAR LAMINECTOMY  09/17/2015    Laminectomy Lumbar    OTHER SURGICAL HISTORY  08/30/2022    Appendectomy    OTHER SURGICAL HISTORY  08/30/2022    Knee arthroscopy     Past Medical History:   Diagnosis Date    Benign positional vertigo 02/15/2023    Diverticulitis, colon 08/25/2023    Meniere's disease, left ear 10/05/2022    Meniere's disease of left ear    Personal history of other benign neoplasm 01/04/2019    History of other benign neoplasm    Personal history of other diseases of the digestive system     History of Ennis's esophagus    Personal history of other diseases of the respiratory system 12/06/2017    History of acute sinusitis    Personal history of other endocrine, nutritional and metabolic disease     History of hyperlipidemia       Current Outpatient Medications:     allopurinol (Zyloprim) 100 mg tablet, Take 1 tablet (100 mg) by mouth once daily., Disp: , Rfl:     atorvastatin (Lipitor) 80 mg tablet, TAKE 1 TABLET DAILY (INCREASED), Disp: 90 tablet, Rfl: 3    co-enzyme Q-10 30 mg capsule, Take 1 capsule (30 mg) by mouth once daily., Disp: 30 capsule, Rfl: 11    ergocalciferol (Vitamin D-2) 1.25 MG (66128 UT) capsule, Take 1 capsule (1,250 mcg) by mouth every 14 (fourteen) days., Disp: , Rfl:     omeprazole (PriLOSEC) 40 mg DR capsule, TAKE 1 CAPSULE EVERY MORNING, Disp: 90 capsule, Rfl: 3    psyllium (Metamucil, sugar,) powder, Take by mouth. Use as directed, Disp: , Rfl:      ramipril (Altace) 1.25 mg capsule, Take 1 capsule (1.25 mg) by mouth once daily., Disp: , Rfl:     triamterene-hydrochlorothiazid (Maxzide-25) 37.5-25 mg tablet, TAKE 1 TABLET DAILY (FOLLOW UP 10/13/21), Disp: 90 tablet, Rfl: 0    cholecalciferol (Vitamin D3) 50 MCG (2000 UT) tablet, Take 1 tablet (50 mcg) by mouth once daily., Disp: 30 tablet, Rfl: 11    docusate sodium (Colace) 100 mg capsule, Take 1 capsule (100 mg) by mouth 2 times a day., Disp: 60 capsule, Rfl: 0    EPINEPHrine 0.3 mg/0.3 mL injection syringe, Inject 0.3 mL (0.3 mg) into the muscle 1 time if needed for anaphylaxis for up to 1 dose. As Directed, Disp: 1 each, Rfl: 1    vancomycin (Vancocin) 125 mg capsule, Take 1 capsule (125 mg) by mouth 4 times a day for 10 days., Disp: 40 capsule, Rfl: 0    Current Facility-Administered Medications:     sodium chloride 0.9% infusion, 20 mL/hr, intravenous, Continuous, David Tapia, DO  Prior to Admission medications    Medication Sig Start Date End Date Taking? Authorizing Provider   allopurinol (Zyloprim) 100 mg tablet Take 1 tablet (100 mg) by mouth once daily. 2/27/23  Yes Historical Provider, MD   atorvastatin (Lipitor) 80 mg tablet TAKE 1 TABLET DAILY (INCREASED) 9/7/23  Yes Henry Steve DO   co-enzyme Q-10 30 mg capsule Take 1 capsule (30 mg) by mouth once daily. 4/12/23 4/11/24 Yes Henry Steve DO   ergocalciferol (Vitamin D-2) 1.25 MG (54795 UT) capsule Take 1 capsule (1,250 mcg) by mouth every 14 (fourteen) days. 9/18/23  Yes Historical Provider, MD   omeprazole (PriLOSEC) 40 mg DR capsule TAKE 1 CAPSULE EVERY MORNING 5/3/23  Yes Henry Steve DO   psyllium (Metamucil, sugar,) powder Take by mouth. Use as directed   Yes Historical Provider, MD   ramipril (Altace) 1.25 mg capsule Take 1 capsule (1.25 mg) by mouth once daily. 5/19/23  Yes Historical Provider, MD   triamterene-hydrochlorothiazid (Maxzide-25) 37.5-25 mg tablet TAKE 1 TABLET DAILY (FOLLOW UP 10/13/21)  11/8/23  Yes Jasbir Garcia PA-C   cholecalciferol (Vitamin D3) 50 MCG (2000 UT) tablet Take 1 tablet (50 mcg) by mouth once daily. 11/2/23 11/1/24  Henry Steve DO   docusate sodium (Colace) 100 mg capsule Take 1 capsule (100 mg) by mouth 2 times a day. 11/2/23   Henry Steve DO   EPINEPHrine 0.3 mg/0.3 mL injection syringe Inject 0.3 mL (0.3 mg) into the muscle 1 time if needed for anaphylaxis for up to 1 dose. As Directed 11/2/23   Henry Steve DO   vancomycin (Vancocin) 125 mg capsule Take 1 capsule (125 mg) by mouth 4 times a day for 10 days. 11/8/23 11/18/23  Henry Steve DO   triamterene-hydrochlorothiazid (Maxzide-25) 37.5-25 mg tablet Take 1 tablet by mouth once daily. 1/21/20 11/8/23  Historical Provider, MD     Allergies   Allergen Reactions    Bee Venom Protein (Honey Bee) Anaphylaxis    Morphine Anaphylaxis    Other Anaphylaxis    Penicillin Anaphylaxis    Penicillins Anaphylaxis    Cephalosporins Swelling     Social History     Tobacco Use    Smoking status: Former     Types: Cigarettes    Smokeless tobacco: Former   Substance Use Topics    Alcohol use: Yes     Comment: ocassionally         Chemistry    Lab Results   Component Value Date/Time     11/01/2023 0634    K 4.1 11/01/2023 0634     11/01/2023 0634    CO2 28 11/01/2023 0634    BUN 17 11/01/2023 0634    CREATININE 1.48 (H) 11/01/2023 0634    Lab Results   Component Value Date/Time    CALCIUM 8.9 11/01/2023 0634    ALKPHOS 80 11/01/2023 0634    AST 25 11/01/2023 0634    ALT 21 11/01/2023 0634    BILITOT 0.4 11/01/2023 0634          Lab Results   Component Value Date/Time    WBC 8.8 11/01/2023 0634    HGB 11.7 (L) 11/01/2023 0634    HCT 36.8 (L) 11/01/2023 0634     11/01/2023 0634     Lab Results   Component Value Date/Time    PROTIME 12.7 10/03/2022 1210    INR 1.1 10/03/2022 1210     No results found for this or any previous visit (from the past 4464 hour(s)).  No results found for this or  any previous visit from the past 1095 days.    NPO Detail:  NPO/Void Status  Date of Last Liquid: 11/14/23  Time of Last Liquid: 0530  Date of Last Solid: 11/12/23  Last Intake Type: Clear fluids         Physical Exam    Airway  Mallampati: II  TM distance: >3 FB  Neck ROM: full     Cardiovascular   Rhythm: regular  Rate: normal     Dental    Pulmonary   Breath sounds clear to auscultation     Abdominal   Abdomen: soft  Bowel sounds: normal           Anesthesia Plan    ASA 2     MAC     The patient is not a current smoker.  Patient was not previously instructed to abstain from smoking on day of procedure.  Education provided regarding risk of obstructive sleep apnea.  intravenous induction   Anesthetic plan and risks discussed with patient.    Plan discussed with CRNA.

## 2023-11-14 NOTE — ANESTHESIA POSTPROCEDURE EVALUATION
Patient: Olayinka Kowalski    Procedure Summary       Date: 11/14/23 Room / Location: [facility]    Anesthesia Start: 1139 Anesthesia Stop: 1216    Procedure: COLONOSCOPY Diagnosis:       Rectal bleeding      Rectal bleeding    Scheduled Providers: David Tapia DO Responsible Provider: Henry Palacios    Anesthesia Type: MAC ASA Status: 2            Anesthesia Type: MAC    Vitals Value Taken Time   /68 11/14/23 1216   Temp 36.6 11/14/23 1216   Pulse 82 11/14/23 1216   Resp 16 11/14/23 1216   SpO2 99 11/14/23 1216       Anesthesia Post Evaluation    Patient location during evaluation: PACU  Patient participation: complete - patient participated  Level of consciousness: sleepy but conscious  Pain score: 0  Pain management: adequate  Airway patency: patent  Cardiovascular status: acceptable  Respiratory status: acceptable  Hydration status: acceptable          No notable events documented.

## 2023-11-14 NOTE — DISCHARGE INSTRUCTIONS
Patient Instructions Post Procedure      The anesthetics, sedatives or narcotics which were given to you today will be acting in your body for the next 24 hours, so you might feel a little sleepy or groggy.  This feeling should slowly wear off. Carefully read and follow the instructions.     You received sedation today:  - Do not drive or operate any machinery or power tools of any kind.   - No alcoholic beverages today, not even beer or wine.  - Do not make any important decisions or sign any legal documents.  - No over the counter medications that contain alcohol or that may cause drowsiness.    While it is common to experience mild to moderate abdominal distention, gas, or belching after your procedure, if any of these symptoms occur following discharge from the GI Lab or within one week of having your procedure, call the [facility] to be advised whether a visit to your nearest Urgent Care or Emergency Department is indicated.  Take this paper with you if you go.   - If you develop an allergic reaction to the medications that were given during your procedure such as difficulty breathing, rash, hives, severe nausea, vomiting or lightheadedness.  - If you experience chest pain, shortness of breath, severe abdominal pain, fevers and chills.  -If you develop signs and symptoms of bleeding such as blood in your spit, if your stools turn black, tarry, or bloody  - If you have not urinated within 8 hours following your procedure.  - If your IV site becomes painful, red, inflamed, or looks infected.    If you received a biopsy/polypectomy/sphincterotomy the following instructions apply below:  __ Do not use Aspirin containing products, non-steroidal medications or anti-coagulants for one week following your procedure. (Examples of these types of medications are: Advil, Arthrotec, Aleve, Coumadin, Ecotrin, Heparin, Ibuprofen, Indocin, Motrin, Naprosyn, Nuprin, Plavix, Vioxx, and Voltarin, or their generic  forms.  This list is not all-inclusive.  Check with your physician or pharmacist before resuming medications.)   __ Eat a soft diet today.  Avoid foods that are poorly digested for the next 24 hours.  These foods would include: nuts, beans, lettuce, red meats, and fried foods. Start with liquids and advance your diet as tolerated, gradually work up to eating solids.   __ Do not have a Barium Study or Enema for one week.    Your physician recommends the additional following instructions:    -You have a contact number available for emergencies. The signs and symptoms of potential delayed complications were discussed with you. You may return to normal activities tomorrow.  -Resume your previous diet or other if specified.  -Continue your present medications.   -We are waiting for your pathology results, if applicable.  -The findings and recommendations have been discussed with you and/or family.  - Please see Medication Reconciliation Form for new medication/medications prescribed.     If you experience any problems or have any questions following discharge from the GI Lab, please call: [phone] from 7 am- 4:30 pm.  In the event of an emergency please go to the closest Emergency Department or call Dr. Tapia at [phone]

## 2023-11-15 ENCOUNTER — TELEPHONE (OUTPATIENT)
Dept: GASTROENTEROLOGY | Facility: CLINIC | Age: 63
End: 2023-11-15
Payer: COMMERCIAL

## 2023-11-15 ENCOUNTER — DOCUMENTATION (OUTPATIENT)
Dept: INFUSION THERAPY | Facility: CLINIC | Age: 63
End: 2023-11-15
Payer: COMMERCIAL

## 2023-11-15 DIAGNOSIS — K50.111 CROHN'S DISEASE OF COLON WITH RECTAL BLEEDING (MULTI): ICD-10-CM

## 2023-11-15 PROBLEM — K50.10 CROHN'S COLITIS (MULTI): Status: ACTIVE | Noted: 2023-11-15

## 2023-11-15 RX ORDER — DIPHENHYDRAMINE HYDROCHLORIDE 50 MG/ML
50 INJECTION INTRAMUSCULAR; INTRAVENOUS AS NEEDED
Status: CANCELLED | OUTPATIENT
Start: 2023-11-15

## 2023-11-15 RX ORDER — EPINEPHRINE 0.3 MG/.3ML
0.3 INJECTION SUBCUTANEOUS EVERY 5 MIN PRN
Status: CANCELLED | OUTPATIENT
Start: 2023-11-15

## 2023-11-15 RX ORDER — FAMOTIDINE 10 MG/ML
20 INJECTION INTRAVENOUS ONCE AS NEEDED
Status: CANCELLED | OUTPATIENT
Start: 2023-11-15

## 2023-11-15 RX ORDER — ALBUTEROL SULFATE 0.83 MG/ML
3 SOLUTION RESPIRATORY (INHALATION) AS NEEDED
Status: CANCELLED | OUTPATIENT
Start: 2024-02-21

## 2023-11-15 RX ORDER — DIPHENHYDRAMINE HYDROCHLORIDE 50 MG/ML
50 INJECTION INTRAMUSCULAR; INTRAVENOUS AS NEEDED
Status: CANCELLED | OUTPATIENT
Start: 2024-02-21

## 2023-11-15 RX ORDER — ALBUTEROL SULFATE 0.83 MG/ML
3 SOLUTION RESPIRATORY (INHALATION) AS NEEDED
Status: CANCELLED | OUTPATIENT
Start: 2023-11-15

## 2023-11-15 RX ORDER — FAMOTIDINE 10 MG/ML
20 INJECTION INTRAVENOUS ONCE AS NEEDED
Status: CANCELLED | OUTPATIENT
Start: 2024-02-21

## 2023-11-15 RX ORDER — EPINEPHRINE 0.3 MG/.3ML
0.3 INJECTION SUBCUTANEOUS EVERY 5 MIN PRN
Status: CANCELLED | OUTPATIENT
Start: 2024-02-21

## 2023-11-15 NOTE — PROGRESS NOTES
Patient to be scheduled for new start of Infliximab Remicade infusions.  Diagnosis: Crohn's disease   Dosing is weight based at: _ 5 mg/kg  Dosing weight of: _83.5__ kg  For a total dose of: _400___ mg at weeks ( 0, 2 & 6 (induction)) then every _ 8 weeks thereafter (maintenance)  Labs…  TB drawn/results:11/14/23--in process ___  Hep B SAg drawn/results:11/14/2023-- Non-Reactive___  CBC drawn: 11/1/2023__ (if available)  CMP drawn: 11/1/2023___(if available)  CRP drawn: _11/2/2023__ (If available)  Last infusion received: _N/A__ (if continuation)   Due: Anytime--Schedule 4 weeks out, waiting on prior authorization to process.     Induction and Maintenance Therapy Plans entered if needed? Yes     This result meets treatment criteria.

## 2023-11-15 NOTE — TELEPHONE ENCOUNTER
Spoke with patient in length in regards to remicade infusions. He is ok with  infusion center. He is up to date with FLU and Shingles vaccines. He does not wish to have covid booster for personal reasons. Side effects and possible infusion reactions discussed. [facility] paperwork to be signed by provider and will fax. Prednisone taper discussed with Dr. Tapia. Pt will call with any issues.

## 2023-11-15 NOTE — TELEPHONE ENCOUNTER
----- Message from Taylor Santizo sent at 11/15/2023 11:23 AM EST -----  Regarding: Predisone  Patient stopped in to drop off paperwork. He has some questions about when to take his prednisone. Can you give him a call?  Thank you

## 2023-11-16 LAB
NIL(NEG) CONTROL SPOT COUNT: NORMAL
PANEL A SPOT COUNT: 0
PANEL B SPOT COUNT: 0
POS CONTROL SPOT COUNT: NORMAL
T-SPOT. TB INTERPRETATION: NEGATIVE

## 2023-11-20 ENCOUNTER — TELEPHONE (OUTPATIENT)
Dept: GASTROENTEROLOGY | Facility: CLINIC | Age: 63
End: 2023-11-20
Payer: COMMERCIAL

## 2023-11-20 RX ORDER — INFLIXIMAB 100 MG/10ML
400 INJECTION, POWDER, LYOPHILIZED, FOR SOLUTION INTRAVENOUS SEE ADMIN INSTRUCTIONS
Qty: 400 MG | Refills: 2 | OUTPATIENT
Start: 2023-11-20 | End: 2024-04-22 | Stop reason: ALTCHOICE

## 2023-11-20 RX ORDER — INFLIXIMAB 100 MG/10ML
400 INJECTION, POWDER, LYOPHILIZED, FOR SOLUTION INTRAVENOUS
Qty: 400 MG | Refills: 4 | OUTPATIENT
Start: 2023-11-20 | End: 2024-04-22 | Stop reason: ALTCHOICE

## 2023-11-20 NOTE — TELEPHONE ENCOUNTER
----- Message from Khadijah Polanco RN sent at 11/17/2023  1:49 PM EST -----  Call [employer] at [phone]

## 2023-11-27 LAB
LABORATORY COMMENT REPORT: NORMAL
PATH REPORT.FINAL DX SPEC: NORMAL
PATH REPORT.GROSS SPEC: NORMAL
PATH REPORT.TOTAL CANCER: NORMAL

## 2023-12-06 ENCOUNTER — TELEPHONE (OUTPATIENT)
Dept: GASTROENTEROLOGY | Facility: CLINIC | Age: 63
End: 2023-12-06
Payer: COMMERCIAL

## 2023-12-06 NOTE — TELEPHONE ENCOUNTER
Spoke with patient over the phone. He needs intermittent FMLA forms filled out. He will bring them to NR office. He has 7 days left of pred. I am anticipating answer in regards to PA for remicade this week. We will consult with Dr. Tapia on weaning schedule.

## 2023-12-06 NOTE — TELEPHONE ENCOUNTER
----- Message from Olayinka Kowalski sent at 12/6/2023  9:37 AM EST -----  Regarding: EGD  Contact: [phone]  Khadijah, good morning. I need a favor, [employer] sent me 4 forms that I need to have you fill out for the continuos FMLA in case I would need it. They have asked to have it returned to them via email by the 15th of december. I hate to bother you guys with this. I was going to drop it off either today or tomorrow to your attention. Thanks in advance for all your help.                                                              Olayinka

## 2023-12-06 NOTE — TELEPHONE ENCOUNTER
Olayinka Kowalski is a 62yo male with a PMH of HTN, HLD, CKD, MATTHEW, colon polyps, Ennis's esophagus, GERD, and Ménière's disease who presented with several months of severe bloody diarrhea.  Patient was feeling terrible; it was affecting his work.  + Fatigue.  + Anemia.  Colonoscopy was performed and showed pancolitis.  Interestingly, there were skip lesions.  Thus, I suspect that patient has Crohn's colitis.  We started him on prednisone for induction and will start infliximab for maintenance.  Awaiting infliximab approval.

## 2023-12-08 ENCOUNTER — TELEPHONE (OUTPATIENT)
Dept: GASTROENTEROLOGY | Facility: CLINIC | Age: 63
End: 2023-12-08
Payer: COMMERCIAL

## 2023-12-08 NOTE — TELEPHONE ENCOUNTER
Spoke with [employer] - the fmla forms that were faxed on 11/16 showed up blank on their end. They state ok to refax original FMLA forms on 12/11 to [employer] . I called and LVM updating patient.

## 2023-12-08 NOTE — TELEPHONE ENCOUNTER
LVM regarding FMLA forms that were dropped off. Also updating patient on status of infliximab infusions.

## 2023-12-18 ENCOUNTER — APPOINTMENT (OUTPATIENT)
Dept: PRIMARY CARE | Facility: CLINIC | Age: 63
End: 2023-12-18
Payer: COMMERCIAL

## 2023-12-26 ENCOUNTER — APPOINTMENT (OUTPATIENT)
Dept: INFUSION THERAPY | Facility: CLINIC | Age: 63
End: 2023-12-26
Payer: COMMERCIAL

## 2023-12-26 ENCOUNTER — TELEPHONE (OUTPATIENT)
Dept: GASTROENTEROLOGY | Facility: CLINIC | Age: 63
End: 2023-12-26

## 2023-12-26 NOTE — TELEPHONE ENCOUNTER
Insurance company was supposed to call today at 1600 for a peer to peer for initiation of infliximab.  However, they did not follow through with the appointment and never called.  Will update patient.  Our IBD nurse coordinator to reach out to the insurance company.

## 2023-12-28 ENCOUNTER — TELEPHONE (OUTPATIENT)
Dept: GASTROENTEROLOGY | Facility: CLINIC | Age: 63
End: 2023-12-28
Payer: COMMERCIAL

## 2023-12-28 NOTE — TELEPHONE ENCOUNTER
LVM - updating patient that P2P was approved for remicade. Auth team working on having auth sent to them so patient can be scheduled.

## 2024-01-09 DIAGNOSIS — K50.119 CROHN'S DISEASE OF COLON WITH COMPLICATION (MULTI): ICD-10-CM

## 2024-01-09 RX ORDER — PREDNISONE 10 MG/1
TABLET ORAL
Qty: 84 TABLET | Refills: 0 | Status: SHIPPED | OUTPATIENT
Start: 2024-01-09 | End: 2024-02-20

## 2024-01-10 ENCOUNTER — INFUSION (OUTPATIENT)
Dept: INFUSION THERAPY | Facility: CLINIC | Age: 64
End: 2024-01-10
Payer: COMMERCIAL

## 2024-01-10 VITALS
SYSTOLIC BLOOD PRESSURE: 161 MMHG | WEIGHT: 196.54 LBS | RESPIRATION RATE: 18 BRPM | DIASTOLIC BLOOD PRESSURE: 73 MMHG | BODY MASS INDEX: 25.93 KG/M2 | OXYGEN SATURATION: 98 % | HEART RATE: 72 BPM | TEMPERATURE: 98.3 F

## 2024-01-10 DIAGNOSIS — K50.111 CROHN'S DISEASE OF COLON WITH RECTAL BLEEDING (MULTI): ICD-10-CM

## 2024-01-10 PROCEDURE — 96413 CHEMO IV INFUSION 1 HR: CPT | Performed by: NURSE PRACTITIONER

## 2024-01-10 PROCEDURE — 96415 CHEMO IV INFUSION ADDL HR: CPT | Performed by: NURSE PRACTITIONER

## 2024-01-10 RX ORDER — FAMOTIDINE 10 MG/ML
20 INJECTION INTRAVENOUS ONCE AS NEEDED
Status: CANCELLED | OUTPATIENT
Start: 2024-01-24

## 2024-01-10 RX ORDER — EPINEPHRINE 0.3 MG/.3ML
0.3 INJECTION SUBCUTANEOUS EVERY 5 MIN PRN
Status: CANCELLED | OUTPATIENT
Start: 2024-01-24

## 2024-01-10 RX ORDER — ALBUTEROL SULFATE 0.83 MG/ML
3 SOLUTION RESPIRATORY (INHALATION) AS NEEDED
Status: CANCELLED | OUTPATIENT
Start: 2024-01-24

## 2024-01-10 RX ORDER — DIPHENHYDRAMINE HYDROCHLORIDE 50 MG/ML
50 INJECTION INTRAMUSCULAR; INTRAVENOUS AS NEEDED
Status: CANCELLED | OUTPATIENT
Start: 2024-01-24

## 2024-01-10 ASSESSMENT — ENCOUNTER SYMPTOMS
CARDIOVASCULAR NEGATIVE: 1
MUSCULOSKELETAL NEGATIVE: 1
RESPIRATORY NEGATIVE: 1
CONSTITUTIONAL NEGATIVE: 1
NEUROLOGICAL NEGATIVE: 1

## 2024-01-10 ASSESSMENT — PAIN SCALES - GENERAL: PAINLEVEL: 0-NO PAIN

## 2024-01-10 NOTE — PROGRESS NOTES
[facility]   Infusion Clinic Note   Date: January 10, 2024   Name: Olayinka Kowalski  : 1960   MRN: [id number]         Reason for Visit: OP Infusion (PATIENT HERE FOR REMICADE 400 MG INFUSION)      Accompanied by:Self   Visit Type:: Infusion   Diagnosis: Crohn's disease of colon with rectal bleeding (CMS/HCC)    Allergies:   Allergies as of 01/10/2024 - Reviewed 2023   Allergen Reaction Noted    Bee venom protein (honey bee) Anaphylaxis 2023    Morphine Anaphylaxis 02/15/2023    Other Anaphylaxis 02/15/2023    Penicillin Anaphylaxis 2023    Penicillins Anaphylaxis 02/15/2023    Cephalosporins Swelling 02/15/2023      Current Meds has a current medication list which includes the following prescription(s): allopurinol, atorvastatin, cholecalciferol, co-enzyme q-10, docusate sodium, epinephrine, ergocalciferol, infliximab, infliximab, omeprazole, prednisone, metamucil (sugar), ramipril, and triamterene-hydrochlorothiazid.        Vitals:  Vitals:    01/10/24 1356   BP: 155/81   Pulse: 78   Resp: 16   Temp: 36.2 °C (97.2 °F)   SpO2: 97%   Weight: 89.1 kg (196 lb 8.6 oz)      Infusion Pre-procedure Checklist   Allergies reviewed: yes   Medications reviewed: yes   Contraindications to treatment:No   Previous reaction to current treatment:No   Current Health Issues: None   Pain: 0-no pain [0]'    Is the pain different from normal: No   Is the pain tolerable: n/a   Is your Doctor aware: n/a   Contraindications based on patient history: No   Provider notified: Not applicable   Labs: Labs reviewed   Fall Risk Screening: Mark Fall Risk  History of Falling, Immediate or Within 3 Months: No  Secondary Diagnosis: No  Ambulatory Aid: Walks without aid/bedrest/nurse assist  Intravenous Therapy/Heparin Lock: No  Gait/Transferring: Normal/bedrest/immobile  Mental Status: Oriented to own ability  Mark Fall Risk Score: 0    Review of Systems   Constitutional: Negative.    Respiratory:  "Negative.     Cardiovascular: Negative.    Gastrointestinal:         PATIENT  WITH HX OF DIARRHEA, BLOODY STOOL AND CRAMPING;  STATES NORMAL BOWEL MOVEMENTS 1-3 X/DAY PRESENTLY   Musculoskeletal: Negative.    Neurological: Negative.       Negative for complaint: [] all other systems have been reviewed and are negative for complaint   Infusion Readiness:   Assessment Concerns Related to Infusion: No  Provider notified: n/a  Assess patient for the concerns below. Document provider notification as appropriate:  - Does not meet criteria to treat No  - Has an active or recent infection with/without current antibiotic use No  - Has recent/planned dental work No  - Has recent/planned surgeries No  - Has recently received or plans to receive vaccinations No  - Has treatment related toxicities No  - Is pregnant (unless noted otherwise) N/A    Initiated By: Rosalba Turner RN   Time: 2:32 PM     We administered inFLIXimab (Remicade) 400 mg in sodium chloride 0.9% 250 mL IV.       (Unless otherwise specified on patient specific therapy plan):     TREATMENT CONDITIONS:  Unless otherwise specified on patient specific therapy plan HOLD and notify Provider prior to proceeding with today's infusion if patient with:  o Positive T-Spot  o Reactive Hep B sAg and/or Hep B Core Antibody    Lab Results   Component Value Date    TBSIN Negative 11/14/2023      Lab Results   Component Value Date    HEPBSAG Nonreactive 11/14/2023      No results found for: \"NONUHFIRE\", \"NONUHSWGH\", \"NONUHFISH\", \"EXTHEPBSAG\"  No results found for: \"HBCTI\", \"HEPBCAB\"  Lab Results   Component Value Date    HEPAIGM Nonreactive 11/14/2023    HEPBCIGM Nonreactive 11/14/2023    HEPCAB Nonreactive 11/14/2023        Labs reviewed and patient meets treatment conditions? Yes      Note authored and patient cared for by: Rosalba Turner RN    Note/Encounter reviewed by: MARCELO Polo, FNP-C. This provider on site at time of patient infusion. Infusion staff to notify " this provider of any questions, concerns, abnormals or issues during infusion. No issues reported. Pt. tolerated infusion without difficulty. Pt. not independently evaluated by this provider during today's encounter.

## 2024-01-10 NOTE — PATIENT INSTRUCTIONS
Today :We administered inFLIXimab (Remicade) 400 mg in sodium chloride 0.9% 250 mL IV.     For:   1. Crohn's disease of colon with rectal bleeding (CMS/HCC)         Your next appointment is due in:  2 WEEKS        Please read the  Medication Guide that was given to you and reviewed during todays visit.     (Tell all doctors including dentists that you are taking this medication)     Go to the emergency room or call 911 if:  -You have signs of allergic reaction:   -Rash, hives, itching.   -Swollen, blistered, peeling skin.   -Swelling of face, lips, mouth, tongue or throat.   -Tightness of chest, trouble breathing, swallowing or talking     Call your doctor:  - If IV / injection site gets red, warm, swollen, itchy or leaks fluid or pus.     (Leave dressing on your IV site for at least 2 hours and keep area clean and dry  - If you get sick or have symptoms of infection or are not feeling well for any reason.    (Wash your hands often, stay away from people who are sick)  - If you have side effects from your medication that do not go away or are bothersome.     (Refer to the teaching your nurse gave you for side effects to call your doctor about)    - Common side effects may include:  stuffy nose, headache, feeling tired, muscle aches, upset stomach  - Before receiving any vaccines     - Call the [facility] at   If:  - You get sick, are on antibiotics, have had a recent vaccine, have surgery or dental work and your doctor wants your visit rescheduled.  - You need to cancel and reschedule your visit for any reason. Call at least 2 days before your visit if you need to cancel.   - Your insurance changes before your next visit.    (We will need to get approval from your new insurance. This can take up to two weeks.)     The [facility] is opened Monday thru Friday. We are closed on weekends and holidays.   Voice mail will take your call if the center is closed. If you leave a message  please allow 24 hours for a call back during weekdays. If you leave a message on a weekend/holiday, we will call you back the next business day.

## 2024-01-24 ENCOUNTER — INFUSION (OUTPATIENT)
Dept: INFUSION THERAPY | Facility: CLINIC | Age: 64
End: 2024-01-24
Payer: COMMERCIAL

## 2024-01-24 VITALS
SYSTOLIC BLOOD PRESSURE: 138 MMHG | WEIGHT: 201.61 LBS | RESPIRATION RATE: 18 BRPM | HEART RATE: 74 BPM | OXYGEN SATURATION: 96 % | BODY MASS INDEX: 26.6 KG/M2 | TEMPERATURE: 97.4 F | DIASTOLIC BLOOD PRESSURE: 80 MMHG

## 2024-01-24 DIAGNOSIS — K50.111 CROHN'S DISEASE OF COLON WITH RECTAL BLEEDING (MULTI): ICD-10-CM

## 2024-01-24 PROCEDURE — 96415 CHEMO IV INFUSION ADDL HR: CPT | Performed by: REGISTERED NURSE

## 2024-01-24 PROCEDURE — 96413 CHEMO IV INFUSION 1 HR: CPT | Performed by: REGISTERED NURSE

## 2024-01-24 RX ORDER — EPINEPHRINE 0.3 MG/.3ML
0.3 INJECTION SUBCUTANEOUS EVERY 5 MIN PRN
Status: CANCELLED | OUTPATIENT
Start: 2024-02-07

## 2024-01-24 RX ORDER — FAMOTIDINE 10 MG/ML
20 INJECTION INTRAVENOUS ONCE AS NEEDED
Status: CANCELLED | OUTPATIENT
Start: 2024-02-07

## 2024-01-24 RX ORDER — DIPHENHYDRAMINE HYDROCHLORIDE 50 MG/ML
50 INJECTION INTRAMUSCULAR; INTRAVENOUS AS NEEDED
Status: CANCELLED | OUTPATIENT
Start: 2024-02-07

## 2024-01-24 RX ORDER — ALBUTEROL SULFATE 0.83 MG/ML
3 SOLUTION RESPIRATORY (INHALATION) AS NEEDED
Status: CANCELLED | OUTPATIENT
Start: 2024-02-07

## 2024-01-24 ASSESSMENT — ENCOUNTER SYMPTOMS
EYES NEGATIVE: 1
CONSTITUTIONAL NEGATIVE: 1
PSYCHIATRIC NEGATIVE: 1
ENDOCRINE NEGATIVE: 1
RESPIRATORY NEGATIVE: 1
NEUROLOGICAL NEGATIVE: 1
MUSCULOSKELETAL NEGATIVE: 1
HEMATOLOGIC/LYMPHATIC NEGATIVE: 1
GASTROINTESTINAL NEGATIVE: 1
CARDIOVASCULAR NEGATIVE: 1

## 2024-01-24 ASSESSMENT — PAIN SCALES - GENERAL: PAINLEVEL: 0-NO PAIN

## 2024-01-24 NOTE — PATIENT INSTRUCTIONS
Today you received:  REMICADE 400MG INFUSION 2ND INDUCTION DOSE    For:    1. Crohn's disease of colon with rectal bleeding (CMS/[facility])         Please read the  Medication Guide that was given to you and reviewed during todays visit.     (Tell all doctors including dentists that you are taking this medication)     Go to the emergency room or call 911 if:  -You have signs of allergic reaction:   o         Rash, hives, itching.   o         Swollen, blistered, peeling skin.   o         Swelling of face, lips, mouth, tongue or throat.   o         Tightness of chest, trouble breathing, swallowing or talking      Call your doctor:     - If IV / injection site gets red, warm, swollen, itchy or leaks fluid or pus.     (Leave dressing on your IV site for at least 2 hours and keep area clean and dry    - If you get sick or have symptoms of infection or are not feeling well for any reason.    (Wash your hands often, stay away from people who are sick)    - If you have side effects from your medication that do not go away or are bothersome.     (Refer to the teaching your nurse gave you for side effects to call your doctor about)     Common side effects may include:  stuffy nose, headache, feeling tired, muscle aches, upset stomach    - Before receiving any vaccines, Call the [facility] at [phone]     - You get sick, are on antibiotics, have had a recent vaccine, have surgery or dental work and your doctor wants your visit rescheduled.    - You need to cancel and reschedule your visit for any reason. Call at least 2 days before your visit if you need to cancel.     - Your insurance changes before your next visit.    (We will need to get approval from your new insurance. This can take up to two weeks.)     The [facility] is opened Monday thru Friday 8am-8pm and Saturday 8am-4:30pm. We are closed on holidays.     Voice mail will take your call if the center is closed. If you leave a message  please allow 24 hours for a call back during weekdays. If you leave a message on a weekend/holiday, we will call you back the next business day.

## 2024-01-24 NOTE — PROGRESS NOTES
[facility]   infusion Clinic Note   Date: 2024   Name: Olayinka Kowalski  : 1960   MRN: [id number]         Reason for Visit: OP Infusion (PATIENT HERE FOR HIS REMICADE 400MG INFUSION 2ND INDUCTION DOSE)         Visit Type: INFUSION      Ordered By: JASIEL WHITLOCK DO      Accompanied by:Self      Diagnosis: Crohn's disease of colon with rectal bleeding (CMS/HCC)       Allergies:   Allergies as of 2024 - Reviewed 2024   Allergen Reaction Noted    Bee venom protein (honey bee) Anaphylaxis 2023    Morphine Anaphylaxis 02/15/2023    Other Anaphylaxis 02/15/2023    Penicillin Anaphylaxis 2023    Penicillins Anaphylaxis 02/15/2023    Cephalosporins Swelling 02/15/2023         Current Medications has a current medication list which includes the following prescription(s): allopurinol, atorvastatin, cholecalciferol, co-enzyme q-10, docusate sodium, epinephrine, ergocalciferol, infliximab, infliximab, omeprazole, prednisone, metamucil (sugar), ramipril, and triamterene-hydrochlorothiazid.       Vitals:  Vitals:    24 1500 24 1605   BP: 152/89 138/80   Pulse: 81 74   Resp: 18 18   Temp: 36.4 °C (97.5 °F) 36.3 °C (97.4 °F)   SpO2: 96% 96%   Weight: 91.4 kg (201 lb 9.8 oz)              Infusion Pre-procedure Checklist:   - Allergies reviewed: yes   - Medications reviewed: yes       - Previous reaction to current treatment: no      Assess patient for the concerns below. Document provider notification as appropriate.  - Active or recent infection with/without current antibiotic use: no  - Recent or planned invasive dental work: no  - Recent or planned surgeries: no  - Recently received or plans to receive vaccinations: no  - Has treatment related toxicities: no  - Is pregnant:  n/a      Pain: 0   - Is the pain different from normal: n/a   - Is the pain tolerable: n/a   - Is your Doctor aware:  n/a      Labs: N/A         Fall Risk Screening: Jas  "Fall Risk  History of Falling, Immediate or Within 3 Months: No  Secondary Diagnosis: No  Ambulatory Aid: Walks without aid/bedrest/nurse assist  Intravenous Therapy/Heparin Lock: No  Gait/Transferring: Normal/bedrest/immobile  Mental Status: Oriented to own ability  Mark Fall Risk Score: 0       Review Of Systems:  Review of Systems   Constitutional: Negative.    HENT:  Negative.     Eyes: Negative.    Respiratory: Negative.     Cardiovascular: Negative.    Gastrointestinal: Negative.    Endocrine: Negative.    Genitourinary: Negative.     Musculoskeletal: Negative.    Skin: Negative.    Neurological: Negative.    Hematological: Negative.    Psychiatric/Behavioral: Negative.           Infusion Readiness:   - Assessment Concerns Related to Infusion: No  - Provider notified: n/a      Document Below Only If Indicated:   New Patient Education:    N/A (returning patient for continuation of therapy. Ongoing education provided as needed.)        Treatment Conditions & Drug Specific Questions:    InFLIXimab  (AVSOLA, INFLECTRA, REMICADE, RENFLEXIS)    (Unless otherwise specified on patient specific therapy plan):     TREATMENT CONDITIONS:  Unless otherwise specified on patient specific therapy plan HOLD and notify Provider prior to proceeding with today's infusion if patient with:  o Positive T-Spot  o Reactive Hep B sAg and/or Hep B Core Antibody    Lab Results   Component Value Date    TBSIN Negative 11/14/2023      Lab Results   Component Value Date    HEPBSAG Nonreactive 11/14/2023      No results found for: \"NONUHFIRE\", \"NONUHSWGH\", \"NONUHFISH\", \"EXTHEPBSAG\"  No results found for: \"HBCTI\", \"HEPBCAB\"  Lab Results   Component Value Date    HEPAIGM Nonreactive 11/14/2023    HEPBCIGM Nonreactive 11/14/2023    HEPCAB Nonreactive 11/14/2023        Labs reviewed and patient meets treatment conditions? Yes    REMINDER:   WEIGHT BASED DRUG     Recommended Vitals/Observation:  Vitals:     Induction: Obtain vital signs every 30 " minutes; at end of observation period and as needed.     Maintenance: Obtain vital signs at start and end of infusions  Observation:     Induction: Patient is monitored for 30 minutes post-infusion     Maintenance: No observation.        Weight Based Drug Calculations:    WEIGHT BASED DRUGS: Infliximab (REMICADE, INFLECTRA, RENFLEXIS)   Patient's dosing weight (kg): 83.5kg     10% weight variance for prescribed treatment: 75.1 kg to 91.9 kg     Patient's weight today:   Vitals:    01/24/24 1500   Weight: 91.4 kg (201 lb 9.8 oz)         weight range for prescribed dose:     Patient weight today falls outside of 10% variance or  weight range: No     [facility] pharmacist informed of weight variance: Not applicable    Doses that are weight based have an acceptable variance rule within 10% of the prescribed   order and/or within  weight range. If patient weight on day of infusion falls   outside of the 10% variance, or weight range, infusion is administered and   pharmacy contacted regarding future dosing adjustments, per policy.         Initiated By: Za Wyatt RN   Time: 5:52 PM     We administered inFLIXimab (Remicade) 400 mg in sodium chloride 0.9% 250 mL IV.

## 2024-02-21 ENCOUNTER — INFUSION (OUTPATIENT)
Dept: INFUSION THERAPY | Facility: CLINIC | Age: 64
End: 2024-02-21
Payer: COMMERCIAL

## 2024-02-21 VITALS
WEIGHT: 197.53 LBS | SYSTOLIC BLOOD PRESSURE: 144 MMHG | HEART RATE: 89 BPM | TEMPERATURE: 96 F | BODY MASS INDEX: 26.06 KG/M2 | OXYGEN SATURATION: 99 % | RESPIRATION RATE: 18 BRPM | DIASTOLIC BLOOD PRESSURE: 69 MMHG

## 2024-02-21 DIAGNOSIS — K50.111 CROHN'S DISEASE OF COLON WITH RECTAL BLEEDING (MULTI): ICD-10-CM

## 2024-02-21 PROCEDURE — 96413 CHEMO IV INFUSION 1 HR: CPT | Performed by: REGISTERED NURSE

## 2024-02-21 PROCEDURE — 96415 CHEMO IV INFUSION ADDL HR: CPT | Performed by: REGISTERED NURSE

## 2024-02-21 RX ORDER — EPINEPHRINE 0.3 MG/.3ML
0.3 INJECTION SUBCUTANEOUS EVERY 5 MIN PRN
OUTPATIENT
Start: 2024-03-06

## 2024-02-21 RX ORDER — DIPHENHYDRAMINE HYDROCHLORIDE 50 MG/ML
50 INJECTION INTRAMUSCULAR; INTRAVENOUS AS NEEDED
OUTPATIENT
Start: 2024-03-06

## 2024-02-21 RX ORDER — FAMOTIDINE 10 MG/ML
20 INJECTION INTRAVENOUS ONCE AS NEEDED
OUTPATIENT
Start: 2024-03-06

## 2024-02-21 RX ORDER — ALBUTEROL SULFATE 0.83 MG/ML
3 SOLUTION RESPIRATORY (INHALATION) AS NEEDED
OUTPATIENT
Start: 2024-03-06

## 2024-02-21 ASSESSMENT — ENCOUNTER SYMPTOMS
NEUROLOGICAL NEGATIVE: 1
PSYCHIATRIC NEGATIVE: 1
RESPIRATORY NEGATIVE: 1
HEMATOLOGIC/LYMPHATIC NEGATIVE: 1
EYES NEGATIVE: 1
GASTROINTESTINAL NEGATIVE: 1
CARDIOVASCULAR NEGATIVE: 1
ENDOCRINE NEGATIVE: 1
CONSTITUTIONAL NEGATIVE: 1
MUSCULOSKELETAL NEGATIVE: 1

## 2024-02-21 NOTE — PATIENT INSTRUCTIONS
Today you received:  REMICADE 400MG INFUSION     For:    1. Crohn's disease of colon with rectal bleeding (CMS/[facility])         Please read the  Medication Guide that was given to you and reviewed during todays visit.     (Tell all doctors including dentists that you are taking this medication)     Go to the emergency room or call 911 if:  -You have signs of allergic reaction:   o         Rash, hives, itching.   o         Swollen, blistered, peeling skin.   o         Swelling of face, lips, mouth, tongue or throat.   o         Tightness of chest, trouble breathing, swallowing or talking      Call your doctor:     - If IV / injection site gets red, warm, swollen, itchy or leaks fluid or pus.     (Leave dressing on your IV site for at least 2 hours and keep area clean and dry    - If you get sick or have symptoms of infection or are not feeling well for any reason.    (Wash your hands often, stay away from people who are sick)    - If you have side effects from your medication that do not go away or are bothersome.     (Refer to the teaching your nurse gave you for side effects to call your doctor about)     Common side effects may include:  stuffy nose, headache, feeling tired, muscle aches, upset stomach    - Before receiving any vaccines, Call the [facility] at [phone]     - You get sick, are on antibiotics, have had a recent vaccine, have surgery or dental work and your doctor wants your visit rescheduled.    - You need to cancel and reschedule your visit for any reason. Call at least 2 days before your visit if you need to cancel.     - Your insurance changes before your next visit.    (We will need to get approval from your new insurance. This can take up to two weeks.)     [facility] is opened Monday thru Friday 8am-8pm and Saturday 8am-4:30pm. We are closed on holidays.     Voice mail will take your call if the center is closed. If you leave a message please allow 24  hours for a call back during weekdays. If you leave a message on a weekend/holiday, we will call you back the next business day.

## 2024-02-21 NOTE — PROGRESS NOTES
[facility]   infusion Clinic Note   Date: 2024   Name: Olayinka Kowalski  : 1960   MRN: [id number]         Reason for Visit: OP Infusion (PATIENT HERE FOR HIS REMICADE 400MG INFUSION (3RD INDUCTION DOSE))         Visit Type: INFUSION       Ordered By: JASIEL WHITLOCK DO      Accompanied by:Self      Diagnosis: Crohn's disease of colon with rectal bleeding (CMS/HCC)       Allergies:   Allergies as of 2024 - Reviewed 2024   Allergen Reaction Noted    Bee venom protein (honey bee) Anaphylaxis 2023    Morphine Anaphylaxis 02/15/2023    Other Anaphylaxis 02/15/2023    Penicillin Anaphylaxis 2023    Penicillins Anaphylaxis 02/15/2023    Cephalosporins Swelling 02/15/2023         Current Medications has a current medication list which includes the following prescription(s): allopurinol, atorvastatin, cholecalciferol, co-enzyme q-10, docusate sodium, epinephrine, ergocalciferol, infliximab, infliximab, omeprazole, metamucil (sugar), ramipril, and triamterene-hydrochlorothiazid.       Vitals:   Vitals:    24 0852 24 1021 24 1237   BP: (!) 156/92 137/83 144/69   Pulse: 93 76 89   Resp: 18 16 18   Temp: 35.6 °C (96 °F)     SpO2: 97% 97% 99%   Weight: 89.6 kg (197 lb 8.5 oz)               Infusion Pre-procedure Checklist:   - Allergies reviewed: yes   - Medications reviewed: yes       - Previous reaction to current treatment: no      Assess patient for the concerns below. Document provider notification as appropriate.  - Active or recent infection with/without current antibiotic use: no  - Recent or planned invasive dental work: no  - Recent or planned surgeries: no  - Recently received or plans to receive vaccinations: no  - Has treatment related toxicities: no  - Is pregnant:  n/a      Pain: 0   - Is the pain different from normal: n/a   - Is the pain tolerable: n/a   - Is your Doctor aware:  n/a      Labs: N/A         Fall Risk Screening:          Review Of Systems:  Review of Systems   Constitutional: Negative.    HENT:  Negative.     Eyes: Negative.    Respiratory: Negative.     Cardiovascular: Negative.    Gastrointestinal: Negative.    Endocrine: Negative.    Genitourinary: Negative.     Musculoskeletal: Negative.    Skin: Negative.    Neurological: Negative.    Hematological: Negative.    Psychiatric/Behavioral: Negative.           Infusion Readiness:   - Assessment Concerns Related to Infusion: No  - Provider notified: n/a      Document Below Only If Indicated:   New Patient Education:    N/A (returning patient for continuation of therapy. Ongoing education provided as needed.)        Treatment Conditions & Drug Specific Questions:    InFLIXimab  (AVSOLA, INFLECTRA, REMICADE, RENFLEXIS)    (Unless otherwise specified on patient specific therapy plan):     TREATMENT CONDITIONS:  Unless otherwise specified on patient specific therapy plan HOLD and notify Provider prior to proceeding with today's infusion if patient with:  o Positive T-Spot  o Reactive Hep B sAg and/or Hep B Core Antibody    Lab Results   Component Value Date    TBSIN Negative 11/14/2023      Lab Results   Component Value Date    HEPBSAG Nonreactive 11/14/2023      Lab Results   Component Value Date    HEPAIGM Nonreactive 11/14/2023    HEPBCIGM Nonreactive 11/14/2023    HEPCAB Nonreactive 11/14/2023        Labs reviewed and patient meets treatment conditions? Yes    REMINDER:   WEIGHT BASED DRUG     Recommended Vitals/Observation:  Vitals:     Induction: Obtain vital signs every 30 minutes; at end of observation period and as needed.     Maintenance: Obtain vital signs at start and end of infusions  Observation:     Induction: Patient is monitored for 30 minutes post-infusion     Maintenance: No observation.        Weight Based Drug Calculations:    WEIGHT BASED DRUGS: Infliximab (REMICADE, INFLECTRA, RENFLEXIS)   Patient's dosing weight (kg): 83.5     10% weight variance for  prescribed treatment: 75.1 kg to 91.9 kg     Patient's weight today:   Vitals:    02/21/24 0852   Weight: 89.6 kg (197 lb 8.5 oz)         weight range for prescribed dose:     Patient weight today falls outside of 10% variance or  weight range: No    [facility] pharmacist informed of weight variance: Not applicable    Doses that are weight based have an acceptable variance rule within 10% of the prescribed   order and/or within  weight range. If patient weight on day of infusion falls   outside of the 10% variance, or weight range, infusion is administered and   pharmacy contacted regarding future dosing adjustments, per policy.         Initiated By: Za Wyatt RN   Time: 12:42 PM     We administered inFLIXimab (Remicade) 400 mg in sodium chloride 0.9% 250 mL IV.

## 2024-02-25 DIAGNOSIS — N18.30 CHRONIC KIDNEY DISEASE, STAGE 3 UNSPECIFIED (MULTI): ICD-10-CM

## 2024-02-27 DIAGNOSIS — I10 ESSENTIAL HYPERTENSION: Primary | ICD-10-CM

## 2024-02-27 RX ORDER — ALLOPURINOL 100 MG/1
100 TABLET ORAL DAILY
Qty: 90 TABLET | Refills: 0 | Status: SHIPPED | OUTPATIENT
Start: 2024-02-27 | End: 2024-03-11 | Stop reason: SDUPTHER

## 2024-02-28 RX ORDER — RAMIPRIL 1.25 MG/1
1.25 CAPSULE ORAL DAILY
Qty: 90 CAPSULE | Refills: 3 | Status: SHIPPED | OUTPATIENT
Start: 2024-02-28 | End: 2024-04-24 | Stop reason: SDUPTHER

## 2024-03-11 ENCOUNTER — TELEPHONE (OUTPATIENT)
Dept: NEPHROLOGY | Facility: CLINIC | Age: 64
End: 2024-03-11
Payer: COMMERCIAL

## 2024-03-11 DIAGNOSIS — N18.31 STAGE 3A CHRONIC KIDNEY DISEASE (MULTI): Primary | ICD-10-CM

## 2024-03-11 DIAGNOSIS — N18.30 CHRONIC KIDNEY DISEASE, STAGE 3 UNSPECIFIED (MULTI): Primary | ICD-10-CM

## 2024-03-11 RX ORDER — ALLOPURINOL 100 MG/1
100 TABLET ORAL DAILY
Qty: 90 TABLET | Refills: 1 | Status: SHIPPED | OUTPATIENT
Start: 2024-03-11 | End: 2024-09-07

## 2024-03-19 ENCOUNTER — APPOINTMENT (OUTPATIENT)
Dept: NEPHROLOGY | Facility: CLINIC | Age: 64
End: 2024-03-19
Payer: COMMERCIAL

## 2024-04-17 ENCOUNTER — INFUSION (OUTPATIENT)
Dept: INFUSION THERAPY | Facility: CLINIC | Age: 64
End: 2024-04-17
Payer: COMMERCIAL

## 2024-04-17 VITALS
RESPIRATION RATE: 16 BRPM | WEIGHT: 202.05 LBS | OXYGEN SATURATION: 97 % | BODY MASS INDEX: 26.66 KG/M2 | SYSTOLIC BLOOD PRESSURE: 145 MMHG | HEART RATE: 60 BPM | TEMPERATURE: 97.7 F | DIASTOLIC BLOOD PRESSURE: 70 MMHG

## 2024-04-17 DIAGNOSIS — K50.10 CROHN'S DISEASE OF COLON WITHOUT COMPLICATION (MULTI): ICD-10-CM

## 2024-04-17 DIAGNOSIS — K50.111 CROHN'S DISEASE OF COLON WITH RECTAL BLEEDING (MULTI): ICD-10-CM

## 2024-04-17 PROCEDURE — 36415 COLL VENOUS BLD VENIPUNCTURE: CPT

## 2024-04-17 PROCEDURE — 96413 CHEMO IV INFUSION 1 HR: CPT | Performed by: NURSE PRACTITIONER

## 2024-04-17 RX ORDER — EPINEPHRINE 0.3 MG/.3ML
0.3 INJECTION SUBCUTANEOUS EVERY 5 MIN PRN
OUTPATIENT
Start: 2024-06-12

## 2024-04-17 RX ORDER — FAMOTIDINE 10 MG/ML
20 INJECTION INTRAVENOUS ONCE AS NEEDED
OUTPATIENT
Start: 2024-06-12

## 2024-04-17 RX ORDER — DIPHENHYDRAMINE HYDROCHLORIDE 50 MG/ML
50 INJECTION INTRAMUSCULAR; INTRAVENOUS AS NEEDED
OUTPATIENT
Start: 2024-06-12

## 2024-04-17 RX ORDER — ALBUTEROL SULFATE 0.83 MG/ML
3 SOLUTION RESPIRATORY (INHALATION) AS NEEDED
OUTPATIENT
Start: 2024-06-12

## 2024-04-17 ASSESSMENT — ENCOUNTER SYMPTOMS
CONSTITUTIONAL NEGATIVE: 1
RESPIRATORY NEGATIVE: 1
CARDIOVASCULAR NEGATIVE: 1
NEUROLOGICAL NEGATIVE: 1
MUSCULOSKELETAL NEGATIVE: 1

## 2024-04-17 NOTE — PROGRESS NOTES
[facility]   Infusion Clinic Note   Date: 2024   Name: Olayinka Kowalski  : 1960   MRN: [id number]         Reason for Visit: OP Infusion (PATIENT HERE FOR Q 8 WEEK REMICADE 400 MG INFUSION)      Accompanied by:Self   Visit Type:: Infusion   Diagnosis: Crohn's disease of colon with rectal bleeding (Multi)    Crohn's disease of colon without complication (Multi)    Allergies:   Allergies as of 2024 - Reviewed 2024   Allergen Reaction Noted   • Bee venom protein (honey bee) Anaphylaxis 2023   • Morphine Anaphylaxis 02/15/2023   • Other Anaphylaxis 02/15/2023   • Penicillin Anaphylaxis 2023   • Penicillins Anaphylaxis 02/15/2023   • Cephalosporins Swelling 02/15/2023      Current Meds has a current medication list which includes the following prescription(s): allopurinol, atorvastatin, cholecalciferol, docusate sodium, epinephrine, ergocalciferol, infliximab, infliximab, omeprazole, metamucil (sugar), ramipril, and triamterene-hydrochlorothiazid.        Vitals:  Vitals:    24 0802   BP: 145/70   Pulse: 60   Resp: 16   Temp: 36.5 °C (97.7 °F)   SpO2: 97%   Weight: 91.7 kg (202 lb 0.8 oz)      Infusion Pre-procedure Checklist   Allergies reviewed: yes   Medications reviewed: yes   Contraindications to treatment:No   Previous reaction to current treatment:No   Current Health Issues: None   Pain: ' 0   Is the pain different from normal: No   Is the pain tolerable: n/a   Is your Doctor aware: n/a   Contraindications based on patient history: No   Provider notified: Not applicable   Labs: Pending  Labs reviewed   Fall Risk Screening: Mark Fall Risk  History of Falling, Immediate or Within 3 Months: No  Secondary Diagnosis: No  Ambulatory Aid: Walks without aid/bedrest/nurse assist  Intravenous Therapy/Heparin Lock: No  Gait/Transferring: Normal/bedrest/immobile  Mental Status: Oriented to own ability  Mark Fall Risk Score: 0    Review of Systems    Constitutional: Negative.    HENT:  Negative.     Respiratory: Negative.     Cardiovascular: Negative.    Gastrointestinal:         PATIENT STATES 1-2 NORMAL BOWEL MOVEMENTS /DAY   Musculoskeletal: Negative.    Skin: Negative.    Neurological: Negative.       Negative for complaint: [] all other systems have been reviewed and are negative for complaint   Infusion Readiness:   Assessment Concerns Related to Infusion: No  Provider notified: n/a  Assess patient for the concerns below. Document provider notification as appropriate:  - Does not meet criteria to treat No  - Has an active or recent infection with/without current antibiotic use No  - Has recent/planned dental work No  - Has recent/planned surgeries No  - Has recently received or plans to receive vaccinations No  - Has treatment related toxicities No  - Is pregnant (unless noted otherwise) N/A    Initiated By: Rosalba Turner RN   Time: 8:27 AM     We administered inFLIXimab (Remicade) 400 mg in sodium chloride 0.9% 250 mL IV.

## 2024-04-17 NOTE — PATIENT INSTRUCTIONS
Today :We administered inFLIXimab (Remicade) 400 mg in sodium chloride 0.9% 250 mL IV.     For:   1. Crohn's disease of colon with rectal bleeding (Multi)    2. Crohn's disease of colon without complication (Multi)         Your next appointment is due in:  8 WEEKS        Please read the  Medication Guide that was given to you and reviewed during todays visit.     (Tell all doctors including dentists that you are taking this medication)     Go to the emergency room or call 911 if:  -You have signs of allergic reaction:   -Rash, hives, itching.   -Swollen, blistered, peeling skin.   -Swelling of face, lips, mouth, tongue or throat.   -Tightness of chest, trouble breathing, swallowing or talking     Call your doctor:  - If IV / injection site gets red, warm, swollen, itchy or leaks fluid or pus.     (Leave dressing on your IV site for at least 2 hours and keep area clean and dry  - If you get sick or have symptoms of infection or are not feeling well for any reason.    (Wash your hands often, stay away from people who are sick)  - If you have side effects from your medication that do not go away or are bothersome.     (Refer to the teaching your nurse gave you for side effects to call your doctor about)    - Common side effects may include:  stuffy nose, headache, feeling tired, muscle aches, upset stomach  - Before receiving any vaccines     - Call the [facility] at   If:  - You get sick, are on antibiotics, have had a recent vaccine, have surgery or dental work and your doctor wants your visit rescheduled.  - You need to cancel and reschedule your visit for any reason. Call at least 2 days before your visit if you need to cancel.   - Your insurance changes before your next visit.    (We will need to get approval from your new insurance. This can take up to two weeks.)     [facility] is opened Monday thru Friday. We are closed on weekends and holidays.   Voice mail will take your  call if the center is closed. If you leave a message please allow 24 hours for a call back during weekdays. If you leave a message on a weekend/holiday, we will call you back the next business day.

## 2024-04-20 LAB — SCAN RESULT: NORMAL

## 2024-04-22 ENCOUNTER — LAB (OUTPATIENT)
Dept: LAB | Facility: LAB | Age: 64
End: 2024-04-22
Payer: COMMERCIAL

## 2024-04-22 ENCOUNTER — TELEPHONE (OUTPATIENT)
Dept: GASTROENTEROLOGY | Facility: CLINIC | Age: 64
End: 2024-04-22

## 2024-04-22 DIAGNOSIS — K50.10 CROHN'S DISEASE OF COLON WITHOUT COMPLICATION (MULTI): ICD-10-CM

## 2024-04-22 DIAGNOSIS — K50.111 CROHN'S DISEASE OF COLON WITH RECTAL BLEEDING (MULTI): Primary | ICD-10-CM

## 2024-04-22 DIAGNOSIS — N18.31 STAGE 3A CHRONIC KIDNEY DISEASE (MULTI): ICD-10-CM

## 2024-04-22 LAB
25(OH)D3 SERPL-MCNC: 36 NG/ML (ref 30–100)
ANION GAP SERPL CALC-SCNC: 15 MMOL/L (ref 10–20)
BUN SERPL-MCNC: 35 MG/DL (ref 6–23)
CALCIUM SERPL-MCNC: 9.8 MG/DL (ref 8.6–10.3)
CHLORIDE SERPL-SCNC: 101 MMOL/L (ref 98–107)
CO2 SERPL-SCNC: 24 MMOL/L (ref 21–32)
CREAT SERPL-MCNC: 1.51 MG/DL (ref 0.5–1.3)
EGFRCR SERPLBLD CKD-EPI 2021: 51 ML/MIN/1.73M*2
GLUCOSE SERPL-MCNC: 113 MG/DL (ref 74–99)
POTASSIUM SERPL-SCNC: 4.4 MMOL/L (ref 3.5–5.3)
PTH-INTACT SERPL-MCNC: 61.3 PG/ML (ref 18.5–88)
SODIUM SERPL-SCNC: 136 MMOL/L (ref 136–145)

## 2024-04-22 PROCEDURE — 80048 BASIC METABOLIC PNL TOTAL CA: CPT

## 2024-04-22 PROCEDURE — 36415 COLL VENOUS BLD VENIPUNCTURE: CPT

## 2024-04-22 PROCEDURE — 83970 ASSAY OF PARATHORMONE: CPT

## 2024-04-22 PROCEDURE — 82306 VITAMIN D 25 HYDROXY: CPT

## 2024-04-22 RX ORDER — INFLIXIMAB 100 MG/10ML
900 INJECTION, POWDER, LYOPHILIZED, FOR SOLUTION INTRAVENOUS
Qty: 900 MG | Refills: 4 | OUTPATIENT
Start: 2024-04-22

## 2024-04-22 RX ORDER — EPINEPHRINE 0.3 MG/.3ML
0.3 INJECTION SUBCUTANEOUS EVERY 5 MIN PRN
OUTPATIENT
Start: 2024-06-12

## 2024-04-22 RX ORDER — DIPHENHYDRAMINE HYDROCHLORIDE 50 MG/ML
50 INJECTION INTRAMUSCULAR; INTRAVENOUS AS NEEDED
OUTPATIENT
Start: 2024-06-12

## 2024-04-22 RX ORDER — FAMOTIDINE 10 MG/ML
20 INJECTION INTRAVENOUS ONCE AS NEEDED
OUTPATIENT
Start: 2024-06-12

## 2024-04-22 RX ORDER — ALBUTEROL SULFATE 0.83 MG/ML
3 SOLUTION RESPIRATORY (INHALATION) AS NEEDED
OUTPATIENT
Start: 2024-06-12

## 2024-04-22 NOTE — TELEPHONE ENCOUNTER
----- Message from David Tapia DO sent at 4/21/2024  7:00 PM EDT -----  Hi,    He has low drug level and antibodies - can we increase him to q4 week dosing (or 10mg/kg if it cheaper for him)?     Rick

## 2024-04-22 NOTE — TELEPHONE ENCOUNTER
Discussed results of infliximab TDM. Will send request for prior auth to [facility] . Will be due for next infusion on 6/12

## 2024-04-23 DIAGNOSIS — K21.9 GASTROESOPHAGEAL REFLUX DISEASE WITHOUT ESOPHAGITIS: ICD-10-CM

## 2024-04-23 RX ORDER — OMEPRAZOLE 40 MG/1
CAPSULE, DELAYED RELEASE ORAL
Qty: 90 CAPSULE | Refills: 3 | Status: SHIPPED | OUTPATIENT
Start: 2024-04-23

## 2024-04-24 ENCOUNTER — TELEPHONE (OUTPATIENT)
Dept: GASTROENTEROLOGY | Facility: CLINIC | Age: 64
End: 2024-04-24

## 2024-04-24 ENCOUNTER — LAB (OUTPATIENT)
Dept: LAB | Facility: LAB | Age: 64
End: 2024-04-24
Payer: COMMERCIAL

## 2024-04-24 ENCOUNTER — OFFICE VISIT (OUTPATIENT)
Dept: NEPHROLOGY | Facility: CLINIC | Age: 64
End: 2024-04-24
Payer: COMMERCIAL

## 2024-04-24 VITALS
WEIGHT: 203 LBS | DIASTOLIC BLOOD PRESSURE: 89 MMHG | HEIGHT: 73 IN | SYSTOLIC BLOOD PRESSURE: 148 MMHG | BODY MASS INDEX: 26.9 KG/M2 | HEART RATE: 83 BPM

## 2024-04-24 DIAGNOSIS — K50.10 CROHN'S DISEASE OF COLON WITHOUT COMPLICATION (MULTI): Primary | ICD-10-CM

## 2024-04-24 DIAGNOSIS — K50.10 CROHN'S DISEASE OF COLON WITHOUT COMPLICATION (MULTI): ICD-10-CM

## 2024-04-24 DIAGNOSIS — I10 ESSENTIAL HYPERTENSION: ICD-10-CM

## 2024-04-24 LAB
ALBUMIN SERPL BCP-MCNC: 4.6 G/DL (ref 3.4–5)
ALP SERPL-CCNC: 62 U/L (ref 33–136)
ALT SERPL W P-5'-P-CCNC: 21 U/L (ref 10–52)
AST SERPL W P-5'-P-CCNC: 22 U/L (ref 9–39)
BASOPHILS # BLD AUTO: 0.06 X10*3/UL (ref 0–0.1)
BASOPHILS NFR BLD AUTO: 0.8 %
BILIRUB DIRECT SERPL-MCNC: 0.1 MG/DL (ref 0–0.3)
BILIRUB SERPL-MCNC: 0.5 MG/DL (ref 0–1.2)
EOSINOPHIL # BLD AUTO: 0.31 X10*3/UL (ref 0–0.7)
EOSINOPHIL NFR BLD AUTO: 4 %
ERYTHROCYTE [DISTWIDTH] IN BLOOD BY AUTOMATED COUNT: 11.9 % (ref 11.5–14.5)
HCT VFR BLD AUTO: 41.1 % (ref 41–52)
HGB BLD-MCNC: 13.8 G/DL (ref 13.5–17.5)
IMM GRANULOCYTES # BLD AUTO: 0.02 X10*3/UL (ref 0–0.7)
IMM GRANULOCYTES NFR BLD AUTO: 0.3 % (ref 0–0.9)
LYMPHOCYTES # BLD AUTO: 2.48 X10*3/UL (ref 1.2–4.8)
LYMPHOCYTES NFR BLD AUTO: 32.4 %
MCH RBC QN AUTO: 30.6 PG (ref 26–34)
MCHC RBC AUTO-ENTMCNC: 33.6 G/DL (ref 32–36)
MCV RBC AUTO: 91 FL (ref 80–100)
MONOCYTES # BLD AUTO: 0.69 X10*3/UL (ref 0.1–1)
MONOCYTES NFR BLD AUTO: 9 %
NEUTROPHILS # BLD AUTO: 4.1 X10*3/UL (ref 1.2–7.7)
NEUTROPHILS NFR BLD AUTO: 53.5 %
NRBC BLD-RTO: 0 /100 WBCS (ref 0–0)
PLATELET # BLD AUTO: 282 X10*3/UL (ref 150–450)
PROT SERPL-MCNC: 6.8 G/DL (ref 6.4–8.2)
RBC # BLD AUTO: 4.51 X10*6/UL (ref 4.5–5.9)
WBC # BLD AUTO: 7.7 X10*3/UL (ref 4.4–11.3)

## 2024-04-24 PROCEDURE — 83789 MASS SPECTROMETRY QUAL/QUAN: CPT

## 2024-04-24 PROCEDURE — 3077F SYST BP >= 140 MM HG: CPT | Performed by: INTERNAL MEDICINE

## 2024-04-24 PROCEDURE — 1036F TOBACCO NON-USER: CPT | Performed by: INTERNAL MEDICINE

## 2024-04-24 PROCEDURE — 85025 COMPLETE CBC W/AUTO DIFF WBC: CPT

## 2024-04-24 PROCEDURE — 80076 HEPATIC FUNCTION PANEL: CPT

## 2024-04-24 PROCEDURE — 99214 OFFICE O/P EST MOD 30 MIN: CPT | Performed by: INTERNAL MEDICINE

## 2024-04-24 PROCEDURE — 3079F DIAST BP 80-89 MM HG: CPT | Performed by: INTERNAL MEDICINE

## 2024-04-24 PROCEDURE — 82657 ENZYME CELL ACTIVITY: CPT

## 2024-04-24 PROCEDURE — 36415 COLL VENOUS BLD VENIPUNCTURE: CPT

## 2024-04-24 RX ORDER — RAMIPRIL 1.25 MG/1
2.5 CAPSULE ORAL DAILY
Qty: 180 CAPSULE | Refills: 3 | Status: SHIPPED | OUTPATIENT
Start: 2024-04-24 | End: 2025-04-24

## 2024-04-24 NOTE — PROGRESS NOTES
Olayinka Kowalski   63 y.o.    @@  N/Room: [id number]/Room/bed info not found    Subjective:   The patient is being seen for a routine clinic follow-up of chronic kidney disease. Recently, the disease has been stable. Disease complications:  No hyperkalemia, no hypocalcemia, no hyperphosphatemia, no metabolic acidosis, no coagulopathy, no uremic encephalopathy, no neuropathy and no renal osteodystrophy. The patient is currently asymptomatic. No associated symptoms are reported.       Meds:   Current Outpatient Medications   Medication Sig Dispense Refill    allopurinol (Zyloprim) 100 mg tablet Take 1 tablet (100 mg) by mouth once daily. 90 tablet 1    atorvastatin (Lipitor) 80 mg tablet TAKE 1 TABLET DAILY (INCREASED) 90 tablet 3    EPINEPHrine 0.3 mg/0.3 mL injection syringe Inject 0.3 mL (0.3 mg) into the muscle 1 time if needed for anaphylaxis for up to 1 dose. As Directed 1 each 1    ergocalciferol (Vitamin D-2) 1.25 MG (89173 UT) capsule Take 1 capsule (1,250 mcg) by mouth every 14 (fourteen) days.      inFLIXimab (Remicade) 100 mg injection Infuse 900 mg into a venous catheter every 8 (eight) weeks.  For Maintenance: Every 8 weeks 900 mg 4    omeprazole (PriLOSEC) 40 mg DR capsule TAKE 1 CAPSULE EVERY MORNING 90 capsule 3    ramipril (Altace) 1.25 mg capsule Take 1 capsule (1.25 mg) by mouth once daily. 90 capsule 3    triamterene-hydrochlorothiazid (Maxzide-25) 37.5-25 mg tablet TAKE 1 TABLET DAILY (FOLLOW UP 10/13/21) 90 tablet 0     No current facility-administered medications for this visit.          ROS:  The patient is awake and oriented. No dizziness or lightheadedness. No chills and no fever. No headaches. No nausea and no vomiting. No shortness of breath. No cough. No sputum. No chest pain. No chest tightness. No abdominal pain. No diarrhea and no constipation. No hematemesis or hemoptysis. No hematuria. No rectal bleeding. No melena. No epistaxis. No urinary symptoms. No flank pain. No leg edema. No leg  pain. No weakness. No itching. Overall, the rest of the review of systems is also negative.  12 point review of systems otherwise negative as stated in HPI.        Physical Examination:        Vitals:    04/24/24 1349   BP: 148/89   Pulse: 83     General: The patient is awake, oriented, and is not in any distress.  Head and Neck: Normocephalic. No periorbital edema.  Eyes: non-icteric  Respiratory: Symmetric air entry. Symmetric chest expansion.No respiratory distress.  Cardiovascular: Symmetric peripheral pulses.  Skin: No maculopapular rash.  Abdomen: soft, nt/nd  Musculoskeletal: No peripheral edema in both left and right upper extremities.  No edema in either left or right lower extremities.  Neuro Exam: Speech is fluent. Moves extremities.    Imaging:  === 12/29/22 ===    US RENAL COMPLETE    - Impression -  No hydronephrosis seen bilaterally.    Right renal parapelvic cyst measuring up to 1.1 x 1.1 cm.    Enlarged, heterogeneous prostate.       Blood Labs:  No results found for this or any previous visit (from the past 24 hour(s)).   Lab Results   Component Value Date    PTH 61.3 04/22/2024    PROTUR NEGATIVE 02/24/2023    PROTUR 17 02/24/2023    PHOS 3.8 09/16/2023      Lab Results   Component Value Date    GLUCOSE 113 (H) 04/22/2024    CALCIUM 9.8 04/22/2024     04/22/2024    K 4.4 04/22/2024    CO2 24 04/22/2024     04/22/2024    BUN 35 (H) 04/22/2024    CREATININE 1.51 (H) 04/22/2024         Assessment and Plan:  1 chronic kidney disease stage III. Last creatinine level is 1.5. Stable kidney function. Normal electrolytes. Normal bicarb level. Volume status is good. Normal PTH, phosphorus, vitamin D level.     2. Hyperurecemia: I put him on allopurinol.     #3 hypertension. Blood pressure is on high side. I increased his Ramipril dose.     I will see him in about 6 months for follow-up.          Jorge Lynn MD  Senior Attending Physician  Director of Onco-Nephrology Program  Division of  Nephrology & Hypertension  [facility]

## 2024-04-24 NOTE — TELEPHONE ENCOUNTER
Pt notified that he has a new lab order to be drawn . Pt states he will have drawn by the end of this week as he is going out of town this weekend.

## 2024-04-27 LAB — TPMT BLD-CCNC: 30.8 U/ML (ref 24–44)

## 2024-05-20 ENCOUNTER — TELEPHONE (OUTPATIENT)
Dept: GASTROENTEROLOGY | Facility: CLINIC | Age: 64
End: 2024-05-20
Payer: COMMERCIAL

## 2024-05-20 NOTE — TELEPHONE ENCOUNTER
Received communication about patients rebate not being approved and needed office to verify patient did receive Remicade on 4/17. Called - long hold time - LVM

## 2024-06-03 ENCOUNTER — OFFICE VISIT (OUTPATIENT)
Dept: GASTROENTEROLOGY | Facility: CLINIC | Age: 64
End: 2024-06-03
Payer: COMMERCIAL

## 2024-06-03 VITALS
HEART RATE: 47 BPM | BODY MASS INDEX: 26.24 KG/M2 | RESPIRATION RATE: 16 BRPM | OXYGEN SATURATION: 95 % | HEIGHT: 73 IN | WEIGHT: 198 LBS | DIASTOLIC BLOOD PRESSURE: 82 MMHG | SYSTOLIC BLOOD PRESSURE: 131 MMHG

## 2024-06-03 DIAGNOSIS — K50.10 CROHN'S DISEASE OF COLON WITHOUT COMPLICATION (MULTI): Primary | ICD-10-CM

## 2024-06-03 PROCEDURE — 1036F TOBACCO NON-USER: CPT | Performed by: STUDENT IN AN ORGANIZED HEALTH CARE EDUCATION/TRAINING PROGRAM

## 2024-06-03 PROCEDURE — 99214 OFFICE O/P EST MOD 30 MIN: CPT | Performed by: STUDENT IN AN ORGANIZED HEALTH CARE EDUCATION/TRAINING PROGRAM

## 2024-06-03 PROCEDURE — 3079F DIAST BP 80-89 MM HG: CPT | Performed by: STUDENT IN AN ORGANIZED HEALTH CARE EDUCATION/TRAINING PROGRAM

## 2024-06-03 PROCEDURE — 3075F SYST BP GE 130 - 139MM HG: CPT | Performed by: STUDENT IN AN ORGANIZED HEALTH CARE EDUCATION/TRAINING PROGRAM

## 2024-06-03 RX ORDER — AZATHIOPRINE 50 MG/1
50 TABLET ORAL DAILY
Qty: 30 TABLET | Refills: 0 | Status: SHIPPED | OUTPATIENT
Start: 2024-06-03 | End: 2025-06-03

## 2024-06-03 NOTE — PROGRESS NOTES
"History of Present Illness:   Olayinka Kowalski is a 64yo male with a PMH of HTN, HLD, CKD, MATTHEW, colon polyps, Ennis's esophagus, GERD, and Ménière's disease who presents to clinic for follow-up.  Patient is doing fairly well.  He has noticed that his stools have been changing over the last couple weeks.  +1 episode of bleeding.  This seems to correspond with his low Remicade levels and elevated antibodies.  No other significant concerns at this time.      GI visit 11/2023: \"Patient states that he was doing fine until July when he started to develop bloody diarrhea.  It has been progressively getting worse.  Sometimes, his stools will be small and formed, making him think that maybe there was a constipation component.  His PCP did blood work and stool tests.  Blood work showed a mild anemia.  Stool infectious studies were WNL.  Fecal calprotectin was over 3000.  Patient states that he is now using the restroom multiple times a day.  He is fatigued.  He just does not feel well. Weight is stable.\"    GI visit 5/2023: \"Follow-up of abnormal stools, blood in stools/on toilet paper. Patient states that for the last 7 to 10 days, he has had abnormal stools and blood in the toilet bowl/on the toilet paper. It has progressively gotten worse. He describes the stools as pellet-like and thin. No diarrhea. + Incomplete emptying. He was recently down in [place]. Thus, his daily regimen was different. Different foods and a little bit more alcohol. He denies any abdominal pain. He has had 2 colonoscopies in the last 4 years. Colonoscopy 12/2022: Diverticulosis, SCAD, hemorrhoids.\"     GI visit 11/2022: \"He reports to be feeling much better today after ER visit. No abd pain, nausea/vomiting, diarrhea or constipation. He has normal BM twice daily but did notice blood in his stool for about 1 week after treatment of diverticulitis with abx. However, no further blood in stool for the last 3 weeks. He has also changed his diet, eating " "lots of grains, less red meat and drinks lots of water. His last colonoscopy 3 years ago that revealed 2mm cecal polyp which was removed.\"     Medical/surgery history: Please see above.  Labs: Reviewed.      Review of Systems  ROS Negative unless otherwise stated above.    Past Medical/Surgical History  Past Medical History:   Diagnosis Date    Benign positional vertigo 02/15/2023    Diverticulitis, colon 08/25/2023    Meniere's disease, left ear 10/05/2022    Meniere's disease of left ear    Personal history of other benign neoplasm 01/04/2019    History of other benign neoplasm    Personal history of other diseases of the digestive system     History of Ennis's esophagus    Personal history of other diseases of the respiratory system 12/06/2017    History of acute sinusitis    Personal history of other endocrine, nutritional and metabolic disease     History of hyperlipidemia      Past Surgical History:   Procedure Laterality Date    LUMBAR LAMINECTOMY  09/17/2015    Laminectomy Lumbar    OTHER SURGICAL HISTORY  08/30/2022    Appendectomy    OTHER SURGICAL HISTORY  08/30/2022    Knee arthroscopy        Social History   reports that he has quit smoking. His smoking use included cigarettes. He has quit using smokeless tobacco. He reports current alcohol use. He reports that he does not use drugs.     Family History  family history includes CABG in his father; Heart failure in his father; Hypertension in his father; aortic valve replacement in his father; cardiac pacemaker in his father.     Allergies  Allergies   Allergen Reactions    Bee Venom Protein (Honey Bee) Anaphylaxis    Morphine Anaphylaxis    Other Anaphylaxis    Penicillin Anaphylaxis    Penicillins Anaphylaxis    Cephalosporins Swelling       Medications  Current Outpatient Medications   Medication Instructions    allopurinol (ZYLOPRIM) 100 mg, oral, Daily    atorvastatin (Lipitor) 80 mg tablet TAKE 1 TABLET DAILY (INCREASED)    EPINEPHrine (EPIPEN) " 0.3 mg, intramuscular, Once as needed, As Directed    ergocalciferol (Vitamin D-2) 1.25 MG (99467 UT) capsule 1 capsule, oral, Every 14 days    inFLIXimab (REMICADE) 900 mg, intravenous, Every 8 weeks, For Maintenance: Every 8 weeks    omeprazole (PriLOSEC) 40 mg DR capsule TAKE 1 CAPSULE EVERY MORNING    ramipril (ALTACE) 2.5 mg, oral, Daily    triamterene-hydrochlorothiazid (Maxzide-25) 37.5-25 mg tablet TAKE 1 TABLET DAILY (FOLLOW UP 10/13/21)        Objective   Visit Vitals  /82   Pulse (!) 47   Resp 16        General: A&Ox3, NAD.  HEENT: AT/NC.   CV: RRR.   Resp: CTA bilaterally. No wheezing, rhonchi or rales.   GI: Soft, NT/ND.   Extrem: No edema.   Skin: No Jaundice.   Neuro: No focal deficits.   Psych: Normal mood and affect.     Assessment/Plan   Olayinka Kowalski is a 64yo male with a PMH of HTN, HLD, CKD, MATTHEW, colon polyps, Ennis's esophagus, GERD, and Ménière's disease who presents to clinic for follow-up of Crohn's colitis.  Was doing well on Remicade, now with slowly developing symptoms.  TDM with low drug level, low antibodies.  TPMT: WNL.    -Continue Remicade (will increase dose to 10 mg/kg given low drug level, low antibodies).  -Start Imuran 50 mg daily (Long discussion regarding the side effects and potential serious concerns with Imuran).  -Mediterranean diet.  -Obtain fecal calprotectin.  -Obtain CBC, BMP, LFTs, serum inflammatory markers 2-4 weeks after starting Imuran.      David Tapia DO

## 2024-06-05 ENCOUNTER — TELEPHONE (OUTPATIENT)
Dept: GASTROENTEROLOGY | Facility: CLINIC | Age: 64
End: 2024-06-05
Payer: COMMERCIAL

## 2024-06-06 ENCOUNTER — LAB (OUTPATIENT)
Dept: LAB | Facility: LAB | Age: 64
End: 2024-06-06
Payer: COMMERCIAL

## 2024-06-06 DIAGNOSIS — K50.10 CROHN'S DISEASE OF COLON WITHOUT COMPLICATION (MULTI): ICD-10-CM

## 2024-06-06 PROCEDURE — 83993 ASSAY FOR CALPROTECTIN FECAL: CPT

## 2024-06-08 LAB — CALPROTECTIN STL-MCNT: 871 UG/G

## 2024-06-11 DIAGNOSIS — K50.111 CROHN'S DISEASE OF COLON WITH RECTAL BLEEDING (MULTI): Primary | ICD-10-CM

## 2024-06-11 RX ORDER — ALBUTEROL SULFATE 0.83 MG/ML
3 SOLUTION RESPIRATORY (INHALATION) AS NEEDED
OUTPATIENT
Start: 2024-06-11

## 2024-06-11 RX ORDER — EPINEPHRINE 0.3 MG/.3ML
0.3 INJECTION SUBCUTANEOUS EVERY 5 MIN PRN
OUTPATIENT
Start: 2024-06-11

## 2024-06-11 RX ORDER — DIPHENHYDRAMINE HYDROCHLORIDE 50 MG/ML
50 INJECTION INTRAMUSCULAR; INTRAVENOUS AS NEEDED
OUTPATIENT
Start: 2024-06-11

## 2024-06-11 RX ORDER — FAMOTIDINE 10 MG/ML
20 INJECTION INTRAVENOUS ONCE AS NEEDED
OUTPATIENT
Start: 2024-06-11

## 2024-06-11 NOTE — TELEPHONE ENCOUNTER
Patient called stating that he was called by the infusion center and they are wanting to cancel his infusion tomorrow. I saw you scanned denial in on 6/7, but in his voicemail he said that he was told it was approved.. are you able to help with this?

## 2024-06-12 ENCOUNTER — APPOINTMENT (OUTPATIENT)
Dept: INFUSION THERAPY | Facility: CLINIC | Age: 64
End: 2024-06-12
Payer: COMMERCIAL

## 2024-06-12 VITALS
SYSTOLIC BLOOD PRESSURE: 143 MMHG | DIASTOLIC BLOOD PRESSURE: 74 MMHG | RESPIRATION RATE: 18 BRPM | TEMPERATURE: 97.8 F | HEART RATE: 94 BPM | OXYGEN SATURATION: 98 % | BODY MASS INDEX: 25.86 KG/M2 | WEIGHT: 195.99 LBS

## 2024-06-12 DIAGNOSIS — K50.10 CROHN'S DISEASE OF COLON WITHOUT COMPLICATION (MULTI): ICD-10-CM

## 2024-06-12 DIAGNOSIS — K50.111 CROHN'S DISEASE OF COLON WITH RECTAL BLEEDING (MULTI): Primary | ICD-10-CM

## 2024-06-12 DIAGNOSIS — K50.119 CROHN'S DISEASE OF COLON WITH COMPLICATION (MULTI): ICD-10-CM

## 2024-06-12 LAB
ALBUMIN SERPL BCP-MCNC: 4.6 G/DL (ref 3.4–5)
ALP SERPL-CCNC: 77 U/L (ref 33–136)
ALT SERPL W P-5'-P-CCNC: 17 U/L (ref 10–52)
ANION GAP SERPL CALC-SCNC: 16 MMOL/L (ref 10–20)
AST SERPL W P-5'-P-CCNC: 17 U/L (ref 9–39)
BILIRUB DIRECT SERPL-MCNC: 0.1 MG/DL (ref 0–0.3)
BILIRUB SERPL-MCNC: 0.6 MG/DL (ref 0–1.2)
BUN SERPL-MCNC: 34 MG/DL (ref 6–23)
CALCIUM SERPL-MCNC: 9.9 MG/DL (ref 8.6–10.3)
CHLORIDE SERPL-SCNC: 102 MMOL/L (ref 98–107)
CO2 SERPL-SCNC: 21 MMOL/L (ref 21–32)
CREAT SERPL-MCNC: 1.49 MG/DL (ref 0.5–1.3)
CRP SERPL-MCNC: 1.05 MG/DL
EGFRCR SERPLBLD CKD-EPI 2021: 52 ML/MIN/1.73M*2
GLUCOSE SERPL-MCNC: 151 MG/DL (ref 74–99)
POTASSIUM SERPL-SCNC: 4.2 MMOL/L (ref 3.5–5.3)
PROT SERPL-MCNC: 7.3 G/DL (ref 6.4–8.2)
SODIUM SERPL-SCNC: 135 MMOL/L (ref 136–145)

## 2024-06-12 PROCEDURE — 36415 COLL VENOUS BLD VENIPUNCTURE: CPT

## 2024-06-12 PROCEDURE — 86140 C-REACTIVE PROTEIN: CPT

## 2024-06-12 PROCEDURE — 82248 BILIRUBIN DIRECT: CPT

## 2024-06-12 PROCEDURE — 80053 COMPREHEN METABOLIC PANEL: CPT

## 2024-06-12 PROCEDURE — 96413 CHEMO IV INFUSION 1 HR: CPT | Performed by: REGISTERED NURSE

## 2024-06-12 RX ORDER — EPINEPHRINE 0.3 MG/.3ML
0.3 INJECTION SUBCUTANEOUS EVERY 5 MIN PRN
OUTPATIENT
Start: 2024-06-13

## 2024-06-12 RX ORDER — ALBUTEROL SULFATE 0.83 MG/ML
3 SOLUTION RESPIRATORY (INHALATION) AS NEEDED
OUTPATIENT
Start: 2024-06-13

## 2024-06-12 RX ORDER — FAMOTIDINE 10 MG/ML
20 INJECTION INTRAVENOUS ONCE AS NEEDED
OUTPATIENT
Start: 2024-06-13

## 2024-06-12 RX ORDER — DIPHENHYDRAMINE HYDROCHLORIDE 50 MG/ML
50 INJECTION INTRAMUSCULAR; INTRAVENOUS AS NEEDED
OUTPATIENT
Start: 2024-06-13

## 2024-06-12 RX ORDER — PREDNISONE 10 MG/1
TABLET ORAL DAILY
Qty: 70 TABLET | Refills: 1 | Status: SHIPPED | OUTPATIENT
Start: 2024-06-12 | End: 2024-07-10

## 2024-06-12 ASSESSMENT — ENCOUNTER SYMPTOMS
ABDOMINAL PAIN: 1
BLOOD IN STOOL: 1
RESPIRATORY NEGATIVE: 1
CONSTITUTIONAL NEGATIVE: 1
ABDOMINAL DISTENTION: 1
CARDIOVASCULAR NEGATIVE: 1
NEUROLOGICAL NEGATIVE: 1
MUSCULOSKELETAL NEGATIVE: 1
DIARRHEA: 1

## 2024-06-12 NOTE — PROGRESS NOTES
[facility]   Infusion Clinic Note   Date: 2024   Name: Olayinka Kowalski  : 1960   MRN: [id number]         Reason for Visit: OP Infusion (PATIENT HERE FOR Q 8 WEEK REMICADE 400 MG INFUSION)      Accompanied by:Self   Visit Type:: Infusion   Diagnosis: Crohn's disease of colon with rectal bleeding (Multi)    Crohn's disease of colon without complication (Multi)    Allergies:   Allergies as of 2024 - Reviewed 2024   Allergen Reaction Noted    Bee venom protein (honey bee) Anaphylaxis 2023    Morphine Anaphylaxis 02/15/2023    Other Anaphylaxis 02/15/2023    Penicillin Anaphylaxis 2023    Penicillins Anaphylaxis 02/15/2023    Cephalosporins Swelling 02/15/2023      Current Meds has a current medication list which includes the following prescription(s): allopurinol, atorvastatin, azathioprine, epinephrine, ergocalciferol, infliximab, omeprazole, prednisone, ramipril, and triamterene-hydrochlorothiazid.        Vitals:  Vitals:    24 0907   BP: 143/74   Pulse: 94   Resp: 18   Temp: 36.6 °C (97.8 °F)   SpO2: 98%   Weight: 88.9 kg (195 lb 15.8 oz)      Infusion Pre-procedure Checklist   Allergies reviewed: yes   Medications reviewed: yes   Contraindications to treatment:No   Previous reaction to current treatment:No   Current Health Issues: None   Pain: ' 9 ABDOMEN   Is the pain different from normal: Yes   Is the pain tolerable: yes   Is your Doctor aware: yes   Contraindications based on patient history: No   Provider notified: Not applicable   Labs: Pending  Labs reviewed   Fall Risk Screening: Mark Fall Risk  History of Falling, Immediate or Within 3 Months: No  Secondary Diagnosis: No  Ambulatory Aid: Walks without aid/bedrest/nurse assist  Intravenous Therapy/Heparin Lock: No  Gait/Transferring: Normal/bedrest/immobile  Mental Status: Oriented to own ability  Mark Fall Risk Score: 0    Review of Systems   Constitutional: Negative.    HENT:  Negative.      Respiratory: Negative.     Cardiovascular: Negative.    Gastrointestinal:  Positive for abdominal distention, abdominal pain, blood in stool and diarrhea.        PATIENT WITH INCREASED SYMTOMS   Musculoskeletal: Negative.    Skin: Negative.    Neurological: Negative.       Negative for complaint: [] all other systems have been reviewed and are negative for complaint   Infusion Readiness:   Assessment Concerns Related to Infusion: No  Provider notified: n/a  Assess patient for the concerns below. Document provider notification as appropriate:  - Does not meet criteria to treat No  - Has an active or recent infection with/without current antibiotic use No  - Has recent/planned dental work No  - Has recent/planned surgeries No  - Has recently received or plans to receive vaccinations No  - Has treatment related toxicities No  - Is pregnant (unless noted otherwise) N/A    Initiated By: Rosalba Turner RN   Time: 9:32 AM     We administered inFLIXimab (Remicade) 400 mg in sodium chloride 0.9% 250 mL IV.     NO DOSING WEIGHT PROVIDED     Patient received Remicade 400mg, verified with referring MD and pharmacist until new dose is authorized for patient.   All questions and concerns were addressed at time of visit. Patient was not independently evaluated by the Nurse Practitioner on site at [facility].

## 2024-06-17 ENCOUNTER — TELEPHONE (OUTPATIENT)
Dept: GASTROENTEROLOGY | Facility: CLINIC | Age: 64
End: 2024-06-17
Payer: COMMERCIAL

## 2024-06-25 DIAGNOSIS — K50.10 CROHN'S DISEASE OF COLON WITHOUT COMPLICATION (MULTI): ICD-10-CM

## 2024-06-25 RX ORDER — AZATHIOPRINE 50 MG/1
50 TABLET ORAL DAILY
Qty: 90 TABLET | Refills: 1 | Status: SHIPPED | OUTPATIENT
Start: 2024-06-25

## 2024-06-27 ENCOUNTER — TELEPHONE (OUTPATIENT)
Dept: GASTROENTEROLOGY | Facility: CLINIC | Age: 64
End: 2024-06-27
Payer: COMMERCIAL

## 2024-06-27 NOTE — TELEPHONE ENCOUNTER
External review of denial for remicade 10mg/kg every 8 weeks approved. Letter sent to precert team . Patient notified. His next scheduled infusion is 8/7.

## 2024-07-23 ENCOUNTER — TELEPHONE (OUTPATIENT)
Dept: PRIMARY CARE | Facility: CLINIC | Age: 64
End: 2024-07-23
Payer: COMMERCIAL

## 2024-07-23 DIAGNOSIS — Z12.5 ENCOUNTER FOR PROSTATE CANCER SCREENING: ICD-10-CM

## 2024-07-23 DIAGNOSIS — Z00.00 WELL ADULT HEALTH CHECK: Primary | ICD-10-CM

## 2024-07-23 DIAGNOSIS — Z11.4 ENCOUNTER FOR SCREENING FOR HIV: ICD-10-CM

## 2024-07-24 ENCOUNTER — LAB (OUTPATIENT)
Dept: LAB | Facility: LAB | Age: 64
End: 2024-07-24
Payer: COMMERCIAL

## 2024-07-24 DIAGNOSIS — Z00.00 WELL ADULT HEALTH CHECK: ICD-10-CM

## 2024-07-24 DIAGNOSIS — Z12.5 ENCOUNTER FOR PROSTATE CANCER SCREENING: ICD-10-CM

## 2024-07-24 DIAGNOSIS — K50.10 CROHN'S DISEASE OF COLON WITHOUT COMPLICATION (MULTI): ICD-10-CM

## 2024-07-24 DIAGNOSIS — R73.01 ELEVATED FASTING GLUCOSE: ICD-10-CM

## 2024-07-24 DIAGNOSIS — Z00.00 WELL ADULT EXAM: ICD-10-CM

## 2024-07-24 DIAGNOSIS — Z11.4 ENCOUNTER FOR SCREENING FOR HIV: ICD-10-CM

## 2024-07-24 LAB
ALBUMIN SERPL BCP-MCNC: 4.4 G/DL (ref 3.4–5)
ALP SERPL-CCNC: 62 U/L (ref 33–136)
ALT SERPL W P-5'-P-CCNC: 22 U/L (ref 10–52)
ANION GAP SERPL CALC-SCNC: 14 MMOL/L (ref 10–20)
AST SERPL W P-5'-P-CCNC: 21 U/L (ref 9–39)
BILIRUB SERPL-MCNC: 0.8 MG/DL (ref 0–1.2)
BUN SERPL-MCNC: 25 MG/DL (ref 6–23)
CALCIUM SERPL-MCNC: 9.8 MG/DL (ref 8.6–10.3)
CHLORIDE SERPL-SCNC: 100 MMOL/L (ref 98–107)
CHOLEST SERPL-MCNC: 203 MG/DL (ref 0–199)
CHOLESTEROL/HDL RATIO: 4.6
CO2 SERPL-SCNC: 28 MMOL/L (ref 21–32)
CREAT SERPL-MCNC: 1.48 MG/DL (ref 0.5–1.3)
EGFRCR SERPLBLD CKD-EPI 2021: 52 ML/MIN/1.73M*2
ERYTHROCYTE [DISTWIDTH] IN BLOOD BY AUTOMATED COUNT: 14.6 % (ref 11.5–14.5)
GLUCOSE SERPL-MCNC: 108 MG/DL (ref 74–99)
HCT VFR BLD AUTO: 43.8 % (ref 41–52)
HCV AB SER QL: NONREACTIVE
HDLC SERPL-MCNC: 43.9 MG/DL
HGB BLD-MCNC: 14.4 G/DL (ref 13.5–17.5)
HIV 1+2 AB+HIV1 P24 AG SERPL QL IA: NONREACTIVE
LDLC SERPL CALC-MCNC: 131 MG/DL
MCH RBC QN AUTO: 30.6 PG (ref 26–34)
MCHC RBC AUTO-ENTMCNC: 32.9 G/DL (ref 32–36)
MCV RBC AUTO: 93 FL (ref 80–100)
NON HDL CHOLESTEROL: 159 MG/DL (ref 0–149)
NRBC BLD-RTO: 0 /100 WBCS (ref 0–0)
PLATELET # BLD AUTO: 306 X10*3/UL (ref 150–450)
POTASSIUM SERPL-SCNC: 4.2 MMOL/L (ref 3.5–5.3)
PROT SERPL-MCNC: 6.7 G/DL (ref 6.4–8.2)
PSA SERPL-MCNC: 0.87 NG/ML
RBC # BLD AUTO: 4.7 X10*6/UL (ref 4.5–5.9)
SODIUM SERPL-SCNC: 138 MMOL/L (ref 136–145)
TRIGL SERPL-MCNC: 142 MG/DL (ref 0–149)
TSH SERPL-ACNC: 1.18 MIU/L (ref 0.44–3.98)
VLDL: 28 MG/DL (ref 0–40)
WBC # BLD AUTO: 7 X10*3/UL (ref 4.4–11.3)

## 2024-07-24 PROCEDURE — 80053 COMPREHEN METABOLIC PANEL: CPT

## 2024-07-24 PROCEDURE — 86803 HEPATITIS C AB TEST: CPT

## 2024-07-24 PROCEDURE — 84443 ASSAY THYROID STIM HORMONE: CPT

## 2024-07-24 PROCEDURE — 80061 LIPID PANEL: CPT

## 2024-07-24 PROCEDURE — 84153 ASSAY OF PSA TOTAL: CPT

## 2024-07-24 PROCEDURE — 87389 HIV-1 AG W/HIV-1&-2 AB AG IA: CPT

## 2024-07-24 PROCEDURE — 85027 COMPLETE CBC AUTOMATED: CPT

## 2024-07-24 PROCEDURE — 83735 ASSAY OF MAGNESIUM: CPT

## 2024-07-24 PROCEDURE — 83036 HEMOGLOBIN GLYCOSYLATED A1C: CPT

## 2024-07-24 PROCEDURE — 36415 COLL VENOUS BLD VENIPUNCTURE: CPT

## 2024-07-25 ENCOUNTER — APPOINTMENT (OUTPATIENT)
Dept: PRIMARY CARE | Facility: CLINIC | Age: 64
End: 2024-07-25
Payer: COMMERCIAL

## 2024-07-25 VITALS
WEIGHT: 198.4 LBS | HEART RATE: 91 BPM | HEIGHT: 73 IN | SYSTOLIC BLOOD PRESSURE: 124 MMHG | RESPIRATION RATE: 16 BRPM | OXYGEN SATURATION: 95 % | BODY MASS INDEX: 26.29 KG/M2 | TEMPERATURE: 98 F | DIASTOLIC BLOOD PRESSURE: 76 MMHG

## 2024-07-25 DIAGNOSIS — E78.1 HYPERTRIGLYCERIDEMIA: ICD-10-CM

## 2024-07-25 DIAGNOSIS — K50.10 CROHN'S DISEASE OF COLON WITHOUT COMPLICATION (MULTI): ICD-10-CM

## 2024-07-25 DIAGNOSIS — H81.02 MENIERE'S DISEASE OF LEFT EAR: ICD-10-CM

## 2024-07-25 DIAGNOSIS — I10 ESSENTIAL HYPERTENSION: Primary | ICD-10-CM

## 2024-07-25 DIAGNOSIS — G47.33 OBSTRUCTIVE SLEEP APNEA, ADULT: ICD-10-CM

## 2024-07-25 DIAGNOSIS — K21.9 GASTROESOPHAGEAL REFLUX DISEASE WITHOUT ESOPHAGITIS: ICD-10-CM

## 2024-07-25 DIAGNOSIS — Z91.89 10 YEAR RISK OF MI OR STROKE 7.5% OR GREATER: ICD-10-CM

## 2024-07-25 DIAGNOSIS — R73.01 ELEVATED FASTING GLUCOSE: ICD-10-CM

## 2024-07-25 DIAGNOSIS — E78.2 MIXED HYPERLIPIDEMIA: ICD-10-CM

## 2024-07-25 DIAGNOSIS — Z23 IMMUNIZATION DUE: ICD-10-CM

## 2024-07-25 DIAGNOSIS — K22.70 BARRETT'S ESOPHAGUS WITHOUT DYSPLASIA: ICD-10-CM

## 2024-07-25 DIAGNOSIS — Z00.00 WELL ADULT EXAM: ICD-10-CM

## 2024-07-25 DIAGNOSIS — F17.210 CIGARETTE NICOTINE DEPENDENCE WITHOUT COMPLICATION: ICD-10-CM

## 2024-07-25 LAB — MAGNESIUM SERPL-MCNC: 1.94 MG/DL (ref 1.6–2.4)

## 2024-07-25 PROCEDURE — 3008F BODY MASS INDEX DOCD: CPT | Performed by: INTERNAL MEDICINE

## 2024-07-25 PROCEDURE — 1159F MED LIST DOCD IN RCRD: CPT | Performed by: INTERNAL MEDICINE

## 2024-07-25 PROCEDURE — 1126F AMNT PAIN NOTED NONE PRSNT: CPT | Performed by: INTERNAL MEDICINE

## 2024-07-25 PROCEDURE — 90677 PCV20 VACCINE IM: CPT | Performed by: INTERNAL MEDICINE

## 2024-07-25 PROCEDURE — 90471 IMMUNIZATION ADMIN: CPT | Performed by: INTERNAL MEDICINE

## 2024-07-25 PROCEDURE — 1160F RVW MEDS BY RX/DR IN RCRD: CPT | Performed by: INTERNAL MEDICINE

## 2024-07-25 PROCEDURE — 1123F ACP DISCUSS/DSCN MKR DOCD: CPT | Performed by: INTERNAL MEDICINE

## 2024-07-25 PROCEDURE — 3078F DIAST BP <80 MM HG: CPT | Performed by: INTERNAL MEDICINE

## 2024-07-25 PROCEDURE — 99397 PER PM REEVAL EST PAT 65+ YR: CPT | Performed by: INTERNAL MEDICINE

## 2024-07-25 PROCEDURE — 3074F SYST BP LT 130 MM HG: CPT | Performed by: INTERNAL MEDICINE

## 2024-07-25 RX ORDER — MAGNESIUM 250 MG
1 TABLET ORAL NIGHTLY
Qty: 90 TABLET | Refills: 3 | Status: SHIPPED | OUTPATIENT
Start: 2024-07-25 | End: 2025-07-25

## 2024-07-25 ASSESSMENT — PAIN SCALES - GENERAL: PAINLEVEL: 0-NO PAIN

## 2024-07-25 NOTE — PROGRESS NOTES
"Anca Kowalski is a 64 y.o. male who presents for Annual Exam.  Review labs 124 76  Well Adult Physical   Patient here for a comprehensive physical exam.The patient reports no problems    Do you take any herbs or supplements that were not prescribed by a doctor? yes   Are you taking calcium supplements? no   Are you taking aspirin daily? no     History:  Patient receives prostate care outside our clinic  Date last PSA: 7.24.2024  Colonoscopy: 11.14.2023  Patients last physical was November 2023, patient was advised that it may not get be covered by his insurance due to it not being a year yet, patient states that he needs his physical for work and can not wait until November due to the due date for work being before then.    Is on higher dose of Remicade and hopeful it improves.  Crohn's Colitis.              Review of Systems   All other systems reviewed and are negative.    Objective   /76   Pulse 91   Temp 36.7 °C (98 °F)   Resp 16   Ht 1.854 m (6' 1\")   Wt 90 kg (198 lb 6.4 oz)   SpO2 95%   BMI 26.18 kg/m²     Physical Exam  Constitutional:       Appearance: Normal appearance.   HENT:      Head: Normocephalic.   Eyes:      Conjunctiva/sclera: Conjunctivae normal.   Cardiovascular:      Rate and Rhythm: Normal rate and regular rhythm.      Heart sounds: Normal heart sounds.   Pulmonary:      Effort: Pulmonary effort is normal.      Breath sounds: Normal breath sounds.   Musculoskeletal:      Cervical back: Neck supple.   Skin:     General: Skin is warm and dry.   Neurological:      Mental Status: He is alert.         Assessment/Plan   Problem List Items Addressed This Visit       Elevated fasting glucose    Relevant Orders    Hemoglobin A1c (Completed)    Ennis's esophagus     Follows with GI and last EGD in 2021.             GERD (gastroesophageal reflux disease)    Hyperlipidemia    Hypertriglyceridemia    Meniere syndrome    Obstructive sleep apnea, adult    Essential hypertension - " Primary     Stable          10 year risk of MI or stroke 7.5% or greater    Crohn's colitis (Multi)    Relevant Orders    Magnesium (Completed)     Other Visit Diagnoses       Immunization due        Relevant Orders    Pneumococcal conjugate vaccine, 20-valent (PREVNAR 20) (Completed)    Cigarette nicotine dependence without complication        Relevant Orders    CT lung screening low dose    Well adult exam        Relevant Medications    magnesium 250 mg tablet    Other Relevant Orders    Pneumococcal conjugate vaccine, 20-valent (PREVNAR 20) (Completed)    Magnesium (Completed)    CT lung screening low dose    Vascular US abdominal aorta anuerysm AAA screening    Hemoglobin A1c (Completed)          Encounter Diagnoses   Name Primary?    Essential hypertension Yes    10 year risk of MI or stroke 7.5% or greater     Hypertriglyceridemia     Mixed hyperlipidemia     Meniere's disease of left ear     Crohn's disease of colon without complication (Multi)     Ennis's esophagus without dysplasia     Gastroesophageal reflux disease without esophagitis     Obstructive sleep apnea, adult     Immunization due     Cigarette nicotine dependence without complication     Well adult exam     Elevated fasting glucose      Henry Steve, DO

## 2024-07-26 LAB
EST. AVERAGE GLUCOSE BLD GHB EST-MCNC: 143 MG/DL
HBA1C MFR BLD: 6.6 %

## 2024-08-07 ENCOUNTER — APPOINTMENT (OUTPATIENT)
Dept: INFUSION THERAPY | Facility: CLINIC | Age: 64
End: 2024-08-07
Payer: COMMERCIAL

## 2024-08-07 VITALS
HEART RATE: 93 BPM | RESPIRATION RATE: 18 BRPM | DIASTOLIC BLOOD PRESSURE: 82 MMHG | TEMPERATURE: 97.7 F | SYSTOLIC BLOOD PRESSURE: 118 MMHG | WEIGHT: 196.65 LBS | OXYGEN SATURATION: 98 % | BODY MASS INDEX: 25.94 KG/M2

## 2024-08-07 DIAGNOSIS — K50.111 CROHN'S DISEASE OF COLON WITH RECTAL BLEEDING (MULTI): ICD-10-CM

## 2024-08-07 PROCEDURE — 96413 CHEMO IV INFUSION 1 HR: CPT | Performed by: REGISTERED NURSE

## 2024-08-07 RX ORDER — ALBUTEROL SULFATE 0.83 MG/ML
3 SOLUTION RESPIRATORY (INHALATION) AS NEEDED
OUTPATIENT
Start: 2024-10-02

## 2024-08-07 RX ORDER — EPINEPHRINE 0.3 MG/.3ML
0.3 INJECTION SUBCUTANEOUS EVERY 5 MIN PRN
OUTPATIENT
Start: 2024-10-02

## 2024-08-07 RX ORDER — FAMOTIDINE 10 MG/ML
20 INJECTION INTRAVENOUS ONCE AS NEEDED
OUTPATIENT
Start: 2024-10-02

## 2024-08-07 RX ORDER — DIPHENHYDRAMINE HYDROCHLORIDE 50 MG/ML
50 INJECTION INTRAMUSCULAR; INTRAVENOUS AS NEEDED
OUTPATIENT
Start: 2024-10-02

## 2024-08-07 ASSESSMENT — ENCOUNTER SYMPTOMS
ENDOCRINE NEGATIVE: 1
PSYCHIATRIC NEGATIVE: 1
NEUROLOGICAL NEGATIVE: 1
EYES NEGATIVE: 1
RESPIRATORY NEGATIVE: 1
CONSTITUTIONAL NEGATIVE: 1
GASTROINTESTINAL NEGATIVE: 1
MUSCULOSKELETAL NEGATIVE: 1
HEMATOLOGIC/LYMPHATIC NEGATIVE: 1

## 2024-08-07 NOTE — PATIENT INSTRUCTIONS
Today you received:  REMICADE 900MG INFUSION Q8 WEEKS       For:    1. Crohn's disease of colon with rectal bleeding (Multi)            Please read the  Medication Guide that was given to you and reviewed during todays visit.     (Tell all doctors including dentists that you are taking this medication)     Go to the emergency room or call 911 if:  -You have signs of allergic reaction:   o         Rash, hives, itching.   o         Swollen, blistered, peeling skin.   o         Swelling of face, lips, mouth, tongue or throat.   o         Tightness of chest, trouble breathing, swallowing or talking      Call your doctor:     - If IV / injection site gets red, warm, swollen, itchy or leaks fluid or pus.     (Leave dressing on your IV site for at least 2 hours and keep area clean and dry    - If you get sick or have symptoms of infection or are not feeling well for any reason.    (Wash your hands often, stay away from people who are sick)    - If you have side effects from your medication that do not go away or are bothersome.     (Refer to the teaching your nurse gave you for side effects to call your doctor about)     Common side effects may include:  stuffy nose, headache, feeling tired, muscle aches, upset stomach    - Before receiving any vaccines, Call the [facility] at [phone]     - You get sick, are on antibiotics, have had a recent vaccine, have surgery or dental work and your doctor wants your visit rescheduled.    - You need to cancel and reschedule your visit for any reason. Call at least 2 days before your visit if you need to cancel.     - Your insurance changes before your next visit.    (We will need to get approval from your new insurance. This can take up to two weeks.)     The [facility] is opened Monday thru Friday 8am-8pm and Saturday 8am-4:30pm. We are closed on holidays.     Voice mail will take your call if the center is closed. If you leave a message please  allow 24 hours for a call back during weekdays. If you leave a message on a weekend/holiday, we will call you back the next business day.

## 2024-08-07 NOTE — PROGRESS NOTES
[facility]   Infusion Clinic Note   Date: 2024   Name: Olayinka Kowalski  : 1960   MRN: [id number]          Reason for Visit: OP Infusion (PATIENT HERE FOR HER REMICADE 900MG INFUSION Q8 WEEKS )         Today: We administered inFLIXimab (Remicade) 900 mg in sodium chloride 0.9% 250 mL IV.       Visit Type: INFUSION       Ordered By: JASIEL WHITLOCK MD       Accompanied by:Self       Diagnosis: Crohn's disease of colon with rectal bleeding (Multi)        Allergies:   Allergies as of 2024 - Reviewed 2024   Allergen Reaction Noted    Bee venom protein (honey bee) Anaphylaxis 2023    Morphine Anaphylaxis 02/15/2023    Other Anaphylaxis 02/15/2023    Penicillin Anaphylaxis 2023    Penicillins Anaphylaxis 02/15/2023    Cephalosporins Swelling 02/15/2023          Current Medications has a current medication list which includes the following prescription(s): allopurinol, atorvastatin, epinephrine, ergocalciferol, infliximab, magnesium, omeprazole, ramipril, and triamterene-hydrochlorothiazid.       Vitals:   Vitals:    24 0656   BP: 118/82   Pulse: 93   Resp: 18   Temp: 36.5 °C (97.7 °F)   SpO2: 98%   Weight: 89.2 kg (196 lb 10.4 oz)             Infusion Pre-procedure Checklist:   - Allergies reviewed: yes   - Medications reviewed: yes       - Previous reaction to current treatment: no      Assess patient for the concerns below. Document provider notification as appropriate.  - Active or recent infection with/without current antibiotic use: no  - Recent or planned invasive dental work: no  - Recent or planned surgeries: no  - Recently received or plans to receive vaccinations: no  - Has treatment related toxicities: no  - Is pregnant:  n/a      Pain: 0   - Is the pain different from normal: n/a   - Is the pain tolerable: n/a   - Is your Doctor aware:  n/a       Labs: N/A          Fall Risk Screening:         Review Of Systems:  Review of Systems  "  Constitutional: Negative.    HENT:  Negative.     Eyes: Negative.    Respiratory: Negative.     Gastrointestinal: Negative.    Endocrine: Negative.    Genitourinary: Negative.     Musculoskeletal: Negative.    Skin: Negative.    Neurological: Negative.    Hematological: Negative.    Psychiatric/Behavioral: Negative.           ROS completed? Yes      Infusion Readiness:  - Assessment Concerns Related to Infusion: No  - Provider notified: n/a      Document Below Only If Indicated:   New Patient Education:    N/A (returning patient for continuation of therapy. Ongoing education provided as needed.)        Treatment Conditions & Drug Specific Questions:    InFLIXimab  (AVSOLA, INFLECTRA, REMICADE, RENFLEXIS)    (Unless otherwise specified on patient specific therapy plan):     TREATMENT CONDITIONS:  Unless otherwise specified on patient specific therapy plan HOLD and notify Provider prior to proceeding with today's infusion if patient with:  o Positive T-Spot  o Reactive Hep B sAg and/or Hep B Core Antibody    Lab Results   Component Value Date    TBSIN Negative 11/14/2023      Lab Results   Component Value Date    HEPBSAG Nonreactive 11/14/2023      No results found for: \"NONUHFIRE\", \"NONUHSWGH\", \"NONUHFISH\", \"EXTHEPBSAG\"  No results found for: \"HBCTI\", \"HEPBCAB\"  Lab Results   Component Value Date    HEPAIGM Nonreactive 11/14/2023    HEPBCIGM Nonreactive 11/14/2023    HEPCAB Nonreactive 07/24/2024        Labs reviewed and patient meets treatment conditions? Yes    DRUG SPECIFIC QUESTIONS:    Any new or worsening signs / symptoms of heart failure which may include things like worsening shortness of breath, swelling, fatigue? No   (If yes notify provider before proceeding with today's infusion. New onset or worsening HF have been reported with infliximab)    REMINDER:   WEIGHT BASED DRUG     Recommended Vitals/Observation:  Vitals:     Induction: Obtain vital signs every 30 minutes; at end of observation period and as " needed.     Maintenance: Obtain vital signs at start and end of infusions  Observation:     Induction: Patient is monitored for 30 minutes post-infusion     Maintenance: No observation.        Weight Based Drug Calculations:    WEIGHT BASED DRUGS: Infliximab (REMICADE, INFLECTRA, RENFLEXIS)   Patient's dosing weight (kg): 91.7kg     10% weight variance for prescribed treatment: 82.5 kg to 100.9 kg     Patient's weight today:   Vitals:    08/07/24 0656   Weight: 89.2 kg (196 lb 10.4 oz)         weight range for prescribed dose:     Patient weight today falls outside of 10% variance or  weight range: No     Home Care pharmacist informed of weight variance: Not applicable    Doses that are weight based have an acceptable variance rule within 10% of the prescribed   order and/or within  weight range. If patient weight on day of infusion falls   outside of the 10% variance, or weight range, infusion is administered and   pharmacy contacted regarding future dosing adjustments, per policy.         Initiated By: Za Wyatt RN      All questions and concerns were addressed at time of visit. Patient was not independently evaluated by the Nurse Practitioner on site at [facility].    08/07/24 at 8:54 AM - MARCELO Ogden-CNP

## 2024-08-09 ENCOUNTER — OFFICE VISIT (OUTPATIENT)
Dept: CARDIOLOGY | Facility: CLINIC | Age: 64
End: 2024-08-09
Payer: COMMERCIAL

## 2024-08-09 VITALS
BODY MASS INDEX: 26.24 KG/M2 | HEART RATE: 68 BPM | HEIGHT: 73 IN | DIASTOLIC BLOOD PRESSURE: 80 MMHG | WEIGHT: 198 LBS | SYSTOLIC BLOOD PRESSURE: 124 MMHG

## 2024-08-09 DIAGNOSIS — E78.49 OTHER HYPERLIPIDEMIA: ICD-10-CM

## 2024-08-09 DIAGNOSIS — I10 ESSENTIAL HYPERTENSION: ICD-10-CM

## 2024-08-09 DIAGNOSIS — I25.10 CORONARY ARTERY DISEASE INVOLVING NATIVE CORONARY ARTERY OF NATIVE HEART WITHOUT ANGINA PECTORIS: Primary | ICD-10-CM

## 2024-08-09 PROCEDURE — 1036F TOBACCO NON-USER: CPT | Performed by: INTERNAL MEDICINE

## 2024-08-09 PROCEDURE — 3074F SYST BP LT 130 MM HG: CPT | Performed by: INTERNAL MEDICINE

## 2024-08-09 PROCEDURE — 99214 OFFICE O/P EST MOD 30 MIN: CPT | Performed by: INTERNAL MEDICINE

## 2024-08-09 PROCEDURE — 1159F MED LIST DOCD IN RCRD: CPT | Performed by: INTERNAL MEDICINE

## 2024-08-09 PROCEDURE — 1160F RVW MEDS BY RX/DR IN RCRD: CPT | Performed by: INTERNAL MEDICINE

## 2024-08-09 PROCEDURE — 3079F DIAST BP 80-89 MM HG: CPT | Performed by: INTERNAL MEDICINE

## 2024-08-09 PROCEDURE — 1123F ACP DISCUSS/DSCN MKR DOCD: CPT | Performed by: INTERNAL MEDICINE

## 2024-08-09 PROCEDURE — 3008F BODY MASS INDEX DOCD: CPT | Performed by: INTERNAL MEDICINE

## 2024-08-09 NOTE — ASSESSMENT & PLAN NOTE
HTN:  BP is well controlled.   We discussed sodium restriction, lifestyle modification, and the DASH diet.  I advised the patient to check BPs at home daily, and to contact me with an update in one month.

## 2024-08-09 NOTE — PATIENT INSTRUCTIONS

## 2024-08-09 NOTE — PROGRESS NOTES
"Chief Complaint:   Please see below.     History Of Present Illness:    Olayinka Kowalski is a 64 y.o. male presenting with de facto CAD.    This hypertensive, hyperlipidemic 64-year-old man with de facto coronary artery disease, and a personal history of treated sleep apnea returns to the office in routine follow up. According to ESPINAL with calcium score, his projected 10-year event rate is 21%.  The patient's stress test on July 6, 2023 disclosed no ischemia at 12.5 METS.    The patient denies chest discomfort, dyspnea, palpitations, orthopnea, PND, syncope, and near syncope.  He has completely changed his diet, and feels much better.  He plays golf frquently, and does resistance exercises, along with walking n a treadmill.       Last Recorded Vitals:  Vitals:    08/09/24 1100   BP: 124/80   BP Location: Left arm   Patient Position: Sitting   Pulse: 68   Weight: 89.8 kg (198 lb)   Height: 1.854 m (6' 1\")       Past Medical History:  He has a past medical history of Asthma ([facility]), Benign positional vertigo (02/15/2023), Diverticulitis, colon (08/25/2023), Meniere's disease, left ear (10/05/2022), Personal history of other benign neoplasm (01/04/2019), Personal history of other diseases of the digestive system, Personal history of other diseases of the respiratory system (12/06/2017), and Personal history of other endocrine, nutritional and metabolic disease.    Past Surgical History:  He has a past surgical history that includes Lumbar laminectomy (09/17/2015); Other surgical history (08/30/2022); and Other surgical history (08/30/2022).      Social History:  He reports that he quit smoking about 13 years ago. His smoking use included cigarettes. He has never used smokeless tobacco. He reports current alcohol use. He reports that he does not use drugs.    Family History:  Family History   Problem Relation Name Age of Onset    Other (aortic valve replacement) Father          valvular heart disease, replacement age 71 "    Other (CABG) Father      Other (cardiac pacemaker) Father      Heart failure Father      Hypertension Father          Allergies:  Bee venom protein (honey bee), Morphine, Other, Penicillin, Penicillins, Cephalosporins, and Imuran [azathioprine]    Outpatient Medications:  Current Outpatient Medications   Medication Instructions    allopurinol (ZYLOPRIM) 100 mg, oral, Daily    atorvastatin (Lipitor) 80 mg tablet TAKE 1 TABLET DAILY (INCREASED)    EPINEPHrine (EPIPEN) 0.3 mg, intramuscular, Once as needed, As Directed    ergocalciferol (Vitamin D-2) 1.25 MG (43766 UT) capsule 1 capsule, oral, Every 14 days    inFLIXimab 900 mg, intravenous, Every 8 weeks, For Maintenance: Every 8 weeks    magnesium 250 mg, oral, Nightly    omeprazole (PriLOSEC) 40 mg DR capsule TAKE 1 CAPSULE EVERY MORNING    ramipril (ALTACE) 2.5 mg, oral, Daily    triamterene-hydrochlorothiazid (Maxzide-25) 37.5-25 mg tablet TAKE 1 TABLET DAILY (FOLLOW UP 10/13/21)       Physical Exam:  GENERAL:  pleasant 64 year-old  HEENT: No xanthelasma  NECK: Supple, no palpable adenopathy or thyromegaly  CHEST: Clear to auscultation, respiratory effort unlabored  CARDIAC: RRR, normal S1 and S2, no audible murmur, rub, gallop, carotids are brisk, PMI is not displaced  ABD: Active bowel sounds, nontender, no organomegaly, no evidence of ascites  EXT: No clubbing, cyanosis, edema, or tenderness  NEURO: Awake, alert, appropriate, speech is fluent       Last Labs:  CBC -  Lab Results   Component Value Date    WBC 7.0 07/24/2024    HGB 14.4 07/24/2024    HCT 43.8 07/24/2024    MCV 93 07/24/2024     07/24/2024       CMP -  Lab Results   Component Value Date    CALCIUM 9.8 07/24/2024    PHOS 3.8 09/16/2023    PROT 6.7 07/24/2024    ALBUMIN 4.4 07/24/2024    AST 21 07/24/2024    ALT 22 07/24/2024    ALKPHOS 62 07/24/2024    BILITOT 0.8 07/24/2024       LIPID PANEL -   Lab Results   Component Value Date    CHOL 203 (H) 07/24/2024    TRIG 142 07/24/2024    HDL  43.9 07/24/2024    CHHDL 4.6 07/24/2024    LDLF 65 07/07/2023    VLDL 28 07/24/2024    NHDL 159 (H) 07/24/2024       RENAL FUNCTION PANEL -   Lab Results   Component Value Date    GLUCOSE 108 (H) 07/24/2024     07/24/2024    K 4.2 07/24/2024     07/24/2024    CO2 28 07/24/2024    ANIONGAP 14 07/24/2024    BUN 25 (H) 07/24/2024    CREATININE 1.48 (H) 07/24/2024    GFRMALE 56 (A) 09/16/2023    CALCIUM 9.8 07/24/2024    PHOS 3.8 09/16/2023    ALBUMIN 4.4 07/24/2024        Lab Results   Component Value Date    HGBA1C 6.6 (H) 07/24/2024    HGBA1C 5.5 11/02/2023         Lab review: I have Chemistry CMP:   Lab Results   Component Value Date    ALBUMIN 4.4 07/24/2024    CALCIUM 9.8 07/24/2024    CO2 28 07/24/2024    CREATININE 1.48 (H) 07/24/2024    GLUCOSE 108 (H) 07/24/2024    BILITOT 0.8 07/24/2024    PROT 6.7 07/24/2024    ALT 22 07/24/2024    AST 21 07/24/2024    ALKPHOS 62 07/24/2024   , Chemistry BMP   Lab Results   Component Value Date    GLUCOSE 108 (H) 07/24/2024    CALCIUM 9.8 07/24/2024    CO2 28 07/24/2024    CREATININE 1.48 (H) 07/24/2024   , CBC:  Lab Results   Component Value Date    WBC 7.0 07/24/2024    RBC 4.70 07/24/2024    HGB 14.4 07/24/2024    HCT 43.8 07/24/2024    MCV 93 07/24/2024    MCH 30.6 07/24/2024    MCHC 32.9 07/24/2024    RDW 14.6 (H) 07/24/2024    MPV 9.3 11/01/2023    NRBC 0.0 07/24/2024   , and Lipids:   Lab Results   Component Value Date    CHOL 203 (H) 07/24/2024    HDL 43.9 07/24/2024    LDLCALC 131 (H) 07/24/2024    TRIG 142 07/24/2024       Assessment/Plan   Assessment & Plan  Coronary artery disease involving native coronary artery of native heart without angina pectoris  CAD: The patient has stable, functional class I CAD, and is doing well.  No additional testing is necessary at present. Important aspects of lifestyle modification were discussed in detail with the patient.  Recent labs and testing have been reviewed.    Orders:    Follow Up In Cardiology;  Future    Other hyperlipidemia  Risk factor modification: educational materials were provided to the patient.        Essential hypertension  HTN:  BP is well controlled.   We discussed sodium restriction, lifestyle modification, and the DASH diet.  I advised the patient to check BPs at home daily, and to contact me with an update in one month.             Cain Light MD

## 2024-09-02 DIAGNOSIS — E78.2 MIXED HYPERLIPIDEMIA: ICD-10-CM

## 2024-09-03 RX ORDER — ATORVASTATIN CALCIUM 80 MG/1
TABLET, FILM COATED ORAL
Qty: 90 TABLET | Refills: 3 | Status: SHIPPED | OUTPATIENT
Start: 2024-09-03

## 2024-09-12 ENCOUNTER — HOSPITAL ENCOUNTER (OUTPATIENT)
Dept: RADIOLOGY | Facility: CLINIC | Age: 64
Discharge: HOME | End: 2024-09-12
Payer: COMMERCIAL

## 2024-09-12 DIAGNOSIS — F17.210 CIGARETTE NICOTINE DEPENDENCE WITHOUT COMPLICATION: ICD-10-CM

## 2024-09-12 DIAGNOSIS — Z00.00 WELL ADULT EXAM: ICD-10-CM

## 2024-09-12 PROCEDURE — 76706 US ABDL AORTA SCREEN AAA: CPT

## 2024-09-12 PROCEDURE — 71271 CT THORAX LUNG CANCER SCR C-: CPT

## 2024-09-13 ENCOUNTER — PATIENT MESSAGE (OUTPATIENT)
Dept: GASTROENTEROLOGY | Facility: CLINIC | Age: 64
End: 2024-09-13
Payer: COMMERCIAL

## 2024-09-13 DIAGNOSIS — K50.10 CROHN'S DISEASE OF COLON WITHOUT COMPLICATION (MULTI): Primary | ICD-10-CM

## 2024-09-13 DIAGNOSIS — K22.70 BARRETT'S ESOPHAGUS WITHOUT DYSPLASIA: ICD-10-CM

## 2024-09-20 DIAGNOSIS — J43.1 PANLOBULAR EMPHYSEMA (MULTI): ICD-10-CM

## 2024-09-20 DIAGNOSIS — G47.33 OBSTRUCTIVE SLEEP APNEA, ADULT: Primary | ICD-10-CM

## 2024-10-02 ENCOUNTER — APPOINTMENT (OUTPATIENT)
Dept: INFUSION THERAPY | Facility: CLINIC | Age: 64
End: 2024-10-02
Payer: COMMERCIAL

## 2024-10-02 VITALS
RESPIRATION RATE: 16 BRPM | TEMPERATURE: 97.5 F | WEIGHT: 201.39 LBS | OXYGEN SATURATION: 97 % | DIASTOLIC BLOOD PRESSURE: 74 MMHG | HEART RATE: 100 BPM | BODY MASS INDEX: 26.57 KG/M2 | SYSTOLIC BLOOD PRESSURE: 128 MMHG

## 2024-10-02 DIAGNOSIS — K50.111 CROHN'S DISEASE OF COLON WITH RECTAL BLEEDING (MULTI): ICD-10-CM

## 2024-10-02 PROCEDURE — 96413 CHEMO IV INFUSION 1 HR: CPT | Performed by: REGISTERED NURSE

## 2024-10-02 RX ORDER — EPINEPHRINE 0.3 MG/.3ML
0.3 INJECTION SUBCUTANEOUS EVERY 5 MIN PRN
OUTPATIENT
Start: 2024-11-27

## 2024-10-02 RX ORDER — ALBUTEROL SULFATE 0.83 MG/ML
3 SOLUTION RESPIRATORY (INHALATION) AS NEEDED
OUTPATIENT
Start: 2024-11-27

## 2024-10-02 RX ORDER — FAMOTIDINE 10 MG/ML
20 INJECTION INTRAVENOUS ONCE AS NEEDED
OUTPATIENT
Start: 2024-11-27

## 2024-10-02 RX ORDER — DIPHENHYDRAMINE HYDROCHLORIDE 50 MG/ML
50 INJECTION INTRAMUSCULAR; INTRAVENOUS AS NEEDED
OUTPATIENT
Start: 2024-11-27

## 2024-10-02 ASSESSMENT — ENCOUNTER SYMPTOMS
CONSTITUTIONAL NEGATIVE: 1
EYES NEGATIVE: 1
MUSCULOSKELETAL NEGATIVE: 1
HEMATOLOGIC/LYMPHATIC NEGATIVE: 1
GASTROINTESTINAL NEGATIVE: 1
ENDOCRINE NEGATIVE: 1
PSYCHIATRIC NEGATIVE: 1
RESPIRATORY NEGATIVE: 1
NEUROLOGICAL NEGATIVE: 1

## 2024-10-02 NOTE — PATIENT INSTRUCTIONS
Today :We administered inFLIXimab (Remicade) 900 mg in sodium chloride 0.9% 250 mL IV.     For:   1. Crohn's disease of colon with rectal bleeding (Multi)         Your next appointment is due in:  8 weeks        Please read the  Medication Guide that was given to you and reviewed during todays visit.     (Tell all doctors including dentists that you are taking this medication)     Go to the emergency room or call 911 if:  -You have signs of allergic reaction:   -Rash, hives, itching.   -Swollen, blistered, peeling skin.   -Swelling of face, lips, mouth, tongue or throat.   -Tightness of chest, trouble breathing, swallowing or talking     Call your doctor:  - If IV / injection site gets red, warm, swollen, itchy or leaks fluid or pus.     (Leave dressing on your IV site for at least 2 hours and keep area clean and dry  - If you get sick or have symptoms of infection or are not feeling well for any reason.    (Wash your hands often, stay away from people who are sick)  - If you have side effects from your medication that do not go away or are bothersome.     (Refer to the teaching your nurse gave you for side effects to call your doctor about)    - Common side effects may include:  stuffy nose, headache, feeling tired, muscle aches, upset stomach  - Before receiving any vaccines     - [facility] at   If:  - You get sick, are on antibiotics, have had a recent vaccine, have surgery or dental work and your doctor wants your visit rescheduled.  - You need to cancel and reschedule your visit for any reason. Call at least 2 days before your visit if you need to cancel.   - Your insurance changes before your next visit.    (We will need to get approval from your new insurance. This can take up to two weeks.)     The [facility] is opened Monday thru Friday. We are closed on weekends and holidays.   Voice mail will take your call if the center is closed. If you leave a message please  allow 24 hours for a call back during weekdays. If you leave a message on a weekend/holiday, we will call you back the next business day.

## 2024-10-02 NOTE — PROGRESS NOTES
[facility]   Infusion Clinic Note   Date: 2024   Name: Olayinka Kowalski  : 1960   MRN: [id number]          Reason for Visit: OP Infusion (Pt here for Q 8 week Remicade)         Today: We administered inFLIXimab (Remicade) 900 mg in sodium chloride 0.9% 250 mL IV.       Visit Type: INFUSION       Ordered By: JASIEL WHITLOCK MD       Accompanied by:Self       Diagnosis: Crohn's disease of colon with rectal bleeding (Multi)        Allergies:   Allergies as of 10/02/2024 - Reviewed 10/02/2024   Allergen Reaction Noted    Bee venom protein (honey bee) Anaphylaxis 2023    Morphine Anaphylaxis 02/15/2023    Other Anaphylaxis 02/15/2023    Penicillin Anaphylaxis 2023    Penicillins Anaphylaxis 02/15/2023    Cephalosporins Swelling 02/15/2023    Imuran [azathioprine] Nausea/vomiting 2024          Current Medications has a current medication list which includes the following prescription(s): atorvastatin, epinephrine, ergocalciferol, infliximab, magnesium, omeprazole, ramipril, triamterene-hydrochlorothiazid, and ubidecarenone.       Vitals:   Vitals:    10/02/24 0705 10/02/24 0833   BP: 134/84 128/74   Pulse: 100    Resp: 18 16   Temp: 36.5 °C (97.7 °F) 36.4 °C (97.5 °F)   SpO2: 97%    Weight: 91.3 kg (201 lb 6.2 oz)                Infusion Pre-procedure Checklist:   - Allergies reviewed: yes   - Medications reviewed: yes       - Previous reaction to current treatment: no      Assess patient for the concerns below. Document provider notification as appropriate.  - Active or recent infection with/without current antibiotic use: no  - Recent or planned invasive dental work: no  - Recent or planned surgeries: no  - Recently received or plans to receive vaccinations: no  - Has treatment related toxicities: no  - Is pregnant:  n/a      Pain: 0   - Is the pain different from normal: n/a   - Is the pain tolerable: n/a   - Is your Doctor aware:  n/a       Labs: N/A        "   Fall Risk Screening: Mark Fall Risk  History of Falling, Immediate or Within 3 Months: No  Secondary Diagnosis: No  Ambulatory Aid: Walks without aid/bedrest/nurse assist  Intravenous Therapy/Heparin Lock: No  Gait/Transferring: Normal/bedrest/immobile  Mental Status: Oriented to own ability  Mark Fall Risk Score: 0       Review Of Systems:  Review of Systems   Constitutional: Negative.    HENT:  Negative.     Eyes: Negative.    Respiratory: Negative.     Gastrointestinal: Negative.    Endocrine: Negative.    Genitourinary: Negative.     Musculoskeletal: Negative.    Skin: Negative.    Neurological: Negative.    Hematological: Negative.    Psychiatric/Behavioral: Negative.           ROS completed? Yes      Infusion Readiness:  - Assessment Concerns Related to Infusion: No  - Provider notified: n/a      Document Below Only If Indicated:   New Patient Education:    N/A (returning patient for continuation of therapy. Ongoing education provided as needed.)        Treatment Conditions & Drug Specific Questions:    InFLIXimab  (AVSOLA, INFLECTRA, REMICADE, RENFLEXIS)    (Unless otherwise specified on patient specific therapy plan):     TREATMENT CONDITIONS:  Unless otherwise specified on patient specific therapy plan HOLD and notify Provider prior to proceeding with today's infusion if patient with:  o Positive T-Spot  o Reactive Hep B sAg and/or Hep B Core Antibody    Lab Results   Component Value Date    TBSIN Negative 11/14/2023      Lab Results   Component Value Date    HEPBSAG Nonreactive 11/14/2023      No results found for: \"NONUHFIRE\", \"NONUHSWGH\", \"NONUHFISH\", \"EXTHEPBSAG\"  No results found for: \"HBCTI\", \"HEPBCAB\"  Lab Results   Component Value Date    HEPAIGM Nonreactive 11/14/2023    HEPBCIGM Nonreactive 11/14/2023    HEPCAB Nonreactive 07/24/2024        Labs reviewed and patient meets treatment conditions? Yes    DRUG SPECIFIC QUESTIONS:    Any new or worsening signs / symptoms of heart failure which may " include things like worsening shortness of breath, swelling, fatigue? No   (If yes notify provider before proceeding with today's infusion. New onset or worsening HF have been reported with infliximab)    REMINDER:   WEIGHT BASED DRUG     Recommended Vitals/Observation:  Vitals:     Induction: Obtain vital signs every 30 minutes; at end of observation period and as needed.     Maintenance: Obtain vital signs at start and end of infusions  Observation:     Induction: Patient is monitored for 30 minutes post-infusion     Maintenance: No observation.        Weight Based Drug Calculations:    WEIGHT BASED DRUGS: Infliximab (REMICADE, INFLECTRA, RENFLEXIS)   Patient's dosing weight (kg): 91.7kg     10% weight variance for prescribed treatment: 82.5 kg to 100.9 kg     Patient's weight today:   Vitals:    10/02/24 0705   Weight: 91.3 kg (201 lb 6.2 oz)           weight range for prescribed dose:     Patient weight today falls outside of 10% variance or  weight range: No     Home Care pharmacist informed of weight variance: Not applicable    Doses that are weight based have an acceptable variance rule within 10% of the prescribed   order and/or within  weight range. If patient weight on day of infusion falls   outside of the 10% variance, or weight range, infusion is administered and   pharmacy contacted regarding future dosing adjustments, per policy.         Initiated by: Roxana Salas RN     All questions and concerns were addressed at time of visit. Patient was not independently evaluated by the Nurse Practitioner on site at [facility].    10/02/24 at 8:43 AM - MARCELO Ogden-VINAY

## 2024-10-04 ENCOUNTER — APPOINTMENT (OUTPATIENT)
Dept: OTOLARYNGOLOGY | Facility: CLINIC | Age: 64
End: 2024-10-04
Payer: COMMERCIAL

## 2024-10-04 ENCOUNTER — CLINICAL SUPPORT (OUTPATIENT)
Dept: AUDIOLOGY | Facility: CLINIC | Age: 64
End: 2024-10-04
Payer: COMMERCIAL

## 2024-10-04 DIAGNOSIS — H81.09 MENIERE'S DISEASE, UNSPECIFIED LATERALITY: Primary | ICD-10-CM

## 2024-10-04 DIAGNOSIS — H90.3 ASYMMETRIC SNHL (SENSORINEURAL HEARING LOSS): Primary | ICD-10-CM

## 2024-10-04 DIAGNOSIS — H81.02 MENIERE'S DISEASE OF LEFT EAR: ICD-10-CM

## 2024-10-04 PROCEDURE — 92557 COMPREHENSIVE HEARING TEST: CPT | Performed by: AUDIOLOGIST

## 2024-10-04 PROCEDURE — 1159F MED LIST DOCD IN RCRD: CPT | Performed by: OTOLARYNGOLOGY

## 2024-10-04 PROCEDURE — 1123F ACP DISCUSS/DSCN MKR DOCD: CPT | Performed by: OTOLARYNGOLOGY

## 2024-10-04 PROCEDURE — 1160F RVW MEDS BY RX/DR IN RCRD: CPT | Performed by: OTOLARYNGOLOGY

## 2024-10-04 PROCEDURE — 92567 TYMPANOMETRY: CPT | Performed by: AUDIOLOGIST

## 2024-10-04 PROCEDURE — 99213 OFFICE O/P EST LOW 20 MIN: CPT | Performed by: OTOLARYNGOLOGY

## 2024-10-04 NOTE — PROGRESS NOTES
Mr. Kowalski, age 64, returned today for his annual hearing evaluation during his ENT visit with Dr. Martinez.  He is here today to follow up on Meniere's disease of his left ear as well as hearing loss, left greater than right.  He denies new concern or change in his hearing since his last visit one year ago.    Results:  Otoscopy revealed clear ear canals and tympanic membranes were visualized bilaterally.  Tympanometry revealed Type A sub d tympanograms, indicating normal ear canal volume and peak pressure with increased compliance/mobility bilaterally.  Audiometric thresholds revealed a mild sensorineural hearing loss in his right ear and a moderate sensorineural hearing loss in his left ear.  Word recognition scores were excellent bilaterally.  Hearing levels have remained stable as compared to his last evaluation October 2023.    Recommendations:  Follow-up with PCP, Dr. Steve, as medically directed.  Follow-up with ENT, Dr. Martinez, as medically directed.  Retest hearing annually to monitor.

## 2024-10-04 NOTE — PROGRESS NOTES
Patient returns.  Seeing him back today surveillance recheck for hearing and history of Ménière's disease.  From that vantage point he has done relatively well.  He had 1 episode of dizziness in the last year.  Unfortunately, he has had significant issues with the onset of Crohn's disease.  Remaining ENT inquiry again is otherwise clear.  There have been no significant changes in past medical or past surgical histories except as mentioned.    Exam:  No acute distress.  The external ear structures appear normal. The ear canals patent and the tympanic membranes are intact without evidence of air-fluid levels, retraction, or congenital defects.  Anterior rhinoscopy notes essentially a midline nasal septum. Examination is noted for normal healthy mucosal membranes without any evidence of lesions, polyps, or exudate. The tongue is normally mobile. There are no lesions on the gingiva, buccal, or oral mucosa. There are no oral cavity masses.  The neck is negative for mass lymphadenopathy. The trachea and parotid are clear. The thyroid bed is grossly unremarkable. The salivary gland structures are grossly unremarkable.    Audiogram:  Stable audiometric evaluation with left greater than right low-frequency loss in particular and stable again compared to 1 year ago.    Assessment and plan:  History of Ménière's disease overall stable.  Certainly he is doing very well with this despite the new onset of Crohn's disease which likely has significant risk factor for stress to be increased and yet he still has done well.  We will sit tight continue him on current observation with a surveillance recheck in a year, sooner with issue.  All questions were answered in this regard accordingly.

## 2024-10-16 ENCOUNTER — E-VISIT (OUTPATIENT)
Dept: NEPHROLOGY | Facility: CLINIC | Age: 64
End: 2024-10-16
Payer: COMMERCIAL

## 2024-10-16 DIAGNOSIS — I10 ESSENTIAL HYPERTENSION: Primary | ICD-10-CM

## 2024-10-16 RX ORDER — ALLOPURINOL 100 MG/1
100 TABLET ORAL DAILY
COMMUNITY
End: 2024-10-16 | Stop reason: SDUPTHER

## 2024-10-16 RX ORDER — ALLOPURINOL 100 MG/1
100 TABLET ORAL DAILY
Qty: 30 TABLET | Refills: 3 | Status: SHIPPED | OUTPATIENT
Start: 2024-10-16 | End: 2025-10-16

## 2024-10-18 ENCOUNTER — HOSPITAL ENCOUNTER (OUTPATIENT)
Dept: RESPIRATORY THERAPY | Facility: HOSPITAL | Age: 64
Discharge: HOME | End: 2024-10-18
Payer: COMMERCIAL

## 2024-10-18 DIAGNOSIS — J43.1 PANLOBULAR EMPHYSEMA (MULTI): ICD-10-CM

## 2024-10-18 DIAGNOSIS — G47.33 OBSTRUCTIVE SLEEP APNEA, ADULT: ICD-10-CM

## 2024-10-18 LAB
MGC ASCENT PFT - FEV1 - PRE: 3.69
MGC ASCENT PFT - FEV1 - PREDICTED: 3.56
MGC ASCENT PFT - FVC - PRE: 5.05
MGC ASCENT PFT - FVC - PREDICTED: 4.7

## 2024-10-18 PROCEDURE — 94726 PLETHYSMOGRAPHY LUNG VOLUMES: CPT

## 2024-10-24 ENCOUNTER — APPOINTMENT (OUTPATIENT)
Dept: NEPHROLOGY | Facility: CLINIC | Age: 64
End: 2024-10-24
Payer: COMMERCIAL

## 2024-10-31 ENCOUNTER — APPOINTMENT (OUTPATIENT)
Dept: NEPHROLOGY | Facility: CLINIC | Age: 64
End: 2024-10-31
Payer: COMMERCIAL

## 2024-11-04 DIAGNOSIS — H81.02 MENIERE'S DISEASE OF LEFT EAR: ICD-10-CM

## 2024-11-04 RX ORDER — TRIAMTERENE/HYDROCHLOROTHIAZID 37.5-25 MG
TABLET ORAL
Qty: 90 TABLET | Refills: 3 | Status: SHIPPED | OUTPATIENT
Start: 2024-11-04

## 2024-11-05 DIAGNOSIS — I10 ESSENTIAL HYPERTENSION: ICD-10-CM

## 2024-11-05 RX ORDER — ALLOPURINOL 100 MG/1
100 TABLET ORAL DAILY
Qty: 90 TABLET | Refills: 3 | Status: SHIPPED | OUTPATIENT
Start: 2024-11-05 | End: 2025-11-05

## 2024-11-27 ENCOUNTER — APPOINTMENT (OUTPATIENT)
Dept: INFUSION THERAPY | Facility: CLINIC | Age: 64
End: 2024-11-27
Payer: COMMERCIAL

## 2024-11-27 VITALS
HEART RATE: 79 BPM | DIASTOLIC BLOOD PRESSURE: 76 MMHG | WEIGHT: 203.71 LBS | SYSTOLIC BLOOD PRESSURE: 129 MMHG | OXYGEN SATURATION: 99 % | BODY MASS INDEX: 26.88 KG/M2 | RESPIRATION RATE: 18 BRPM | TEMPERATURE: 98.1 F

## 2024-11-27 DIAGNOSIS — K50.111 CROHN'S DISEASE OF COLON WITH RECTAL BLEEDING (MULTI): ICD-10-CM

## 2024-11-27 RX ORDER — EPINEPHRINE 0.3 MG/.3ML
0.3 INJECTION SUBCUTANEOUS EVERY 5 MIN PRN
OUTPATIENT
Start: 2025-01-22

## 2024-11-27 RX ORDER — ALBUTEROL SULFATE 0.83 MG/ML
3 SOLUTION RESPIRATORY (INHALATION) AS NEEDED
OUTPATIENT
Start: 2025-01-22

## 2024-11-27 RX ORDER — FAMOTIDINE 10 MG/ML
20 INJECTION INTRAVENOUS ONCE AS NEEDED
OUTPATIENT
Start: 2025-01-22

## 2024-11-27 RX ORDER — DIPHENHYDRAMINE HYDROCHLORIDE 50 MG/ML
50 INJECTION INTRAMUSCULAR; INTRAVENOUS AS NEEDED
OUTPATIENT
Start: 2025-01-22

## 2024-11-27 ASSESSMENT — ENCOUNTER SYMPTOMS
CARDIOVASCULAR NEGATIVE: 1
NEUROLOGICAL NEGATIVE: 1
COUGH: 1
CONSTITUTIONAL NEGATIVE: 1
MUSCULOSKELETAL NEGATIVE: 1

## 2024-11-27 NOTE — PATIENT INSTRUCTIONS
Today :We administered inFLIXimab (Remicade) 900 mg in sodium chloride 0.9% 250 mL IV.     For:   1. Crohn's disease of colon with rectal bleeding (Multi)         Your next appointment is due in:  8 WEEKS        Please read the  Medication Guide that was given to you and reviewed during todays visit.     (Tell all doctors including dentists that you are taking this medication)     Go to the emergency room or call 911 if:  -You have signs of allergic reaction:   -Rash, hives, itching.   -Swollen, blistered, peeling skin.   -Swelling of face, lips, mouth, tongue or throat.   -Tightness of chest, trouble breathing, swallowing or talking     Call your doctor:  - If IV / injection site gets red, warm, swollen, itchy or leaks fluid or pus.     (Leave dressing on your IV site for at least 2 hours and keep area clean and dry  - If you get sick or have symptoms of infection or are not feeling well for any reason.    (Wash your hands often, stay away from people who are sick)  - If you have side effects from your medication that do not go away or are bothersome.     (Refer to the teaching your nurse gave you for side effects to call your doctor about)    - Common side effects may include:  stuffy nose, headache, feeling tired, muscle aches, upset stomach  - Before receiving any vaccines     - Call [facility] at   If:  - You get sick, are on antibiotics, have had a recent vaccine, have surgery or dental work and your doctor wants your visit rescheduled.  - You need to cancel and reschedule your visit for any reason. Call at least 2 days before your visit if you need to cancel.   - Your insurance changes before your next visit.    (We will need to get approval from your new insurance. This can take up to two weeks.)     [facility] is opened Monday thru Friday. We are closed on weekends and holidays.   Voice mail will take your call if the center is closed. If you leave a message please  allow 24 hours for a call back during weekdays. If you leave a message on a weekend/holiday, we will call you back the next business day.    A pharmacist is available Monday - Friday from 8:30AM to 3:30PM to help answer any questions you may have about your prescriptions(s). Please call pharmacy at:    [facility]: [phone]  [facility]: [phone]  [facility]: [phone]

## 2024-11-27 NOTE — PROGRESS NOTES
[facility]   Infusion Clinic Note   Date: 2024   Name: Olayinka Kowalski  : 1960   MRN: [id number]          Reason for Visit: OP Infusion (PATIENT HERE FOR Q 8 WEEK REMICADE 900 MG INFUSION)         Today: We administered inFLIXimab (Remicade) 900 mg in sodium chloride 0.9% 250 mL IV.       Visit Type: INFUSION       Ordered By: David Tapia DO        Accompanied by:Self       Diagnosis: Crohn's disease of colon with rectal bleeding (Multi)        Allergies:   Allergies as of 2024 - Reviewed 2024   Allergen Reaction Noted    Bee venom protein (honey bee) Anaphylaxis 2023    Morphine Anaphylaxis 02/15/2023    Other Anaphylaxis 02/15/2023    Penicillin Anaphylaxis 2023    Penicillins Anaphylaxis 02/15/2023    Cephalosporins Swelling 02/15/2023    Imuran [azathioprine] Nausea/vomiting 2024          Current Medications has a current medication list which includes the following prescription(s): allopurinol, atorvastatin, epinephrine, ergocalciferol, infliximab, magnesium, omeprazole, ramipril, triamterene-hydrochlorothiazid, and ubidecarenone.       Vitals:   Vitals:    24 0702 24 0751   BP: 139/81 129/76   Pulse: 103 79   Resp: 18 18   Temp: 36.5 °C (97.7 °F) 36.7 °C (98.1 °F)   SpO2: 99% 99%   Weight: 92.4 kg (203 lb 11.3 oz)              Infusion Pre-procedure Checklist:   - Allergies reviewed: yes   - Medications reviewed: yes       - Previous reaction to current treatment: no      Assess patient for the concerns below. Document provider notification as appropriate.  - Active or recent infection with/without current antibiotic use: no  - Recent or planned invasive dental work: no  - Recent or planned surgeries: no  - Recently received or plans to receive vaccinations: no  - Has treatment related toxicities: no  - Is pregnant:  n/a      Pain: 0   - Is the pain different from normal: n/a   - Is your Doctor aware:  n/a       Labs:  " REVIEWED          Fall Risk Screening: Mark Fall Risk  History of Falling, Immediate or Within 3 Months: No  Secondary Diagnosis: No  Ambulatory Aid: Walks without aid/bedrest/nurse assist  Intravenous Therapy/Heparin Lock: No  Gait/Transferring: Normal/bedrest/immobile  Mental Status: Oriented to own ability  Mark Fall Risk Score: 0       Review Of Systems:  Review of Systems   Constitutional: Negative.    Respiratory:  Positive for cough (CHRONIC COUGH).    Cardiovascular: Negative.    Gastrointestinal:         REPORTS NORMAL BM, 2-3X/DAY   Musculoskeletal: Negative.    Skin: Negative.    Neurological: Negative.          ROS completed? Yes      Infusion Readiness:  - Assessment Concerns Related to Infusion: No  - Provider notified: n/a      Document Below Only If Indicated:   New Patient Education:    N/A (returning patient for continuation of therapy. Ongoing education provided as needed.)        Treatment Conditions & Drug Specific Questions:    InFLIXimab  (AVSOLA, INFLECTRA, REMICADE, RENFLEXIS)    (Unless otherwise specified on patient specific therapy plan):     TREATMENT CONDITIONS:  Unless otherwise specified on patient specific therapy plan HOLD and notify Provider prior to proceeding with today's infusion if patient with:  o Positive T-Spot  o Reactive Hep B sAg and/or Hep B Core Antibody    Lab Results   Component Value Date    TBSIN Negative 11/14/2023      Lab Results   Component Value Date    HEPBSAG Nonreactive 11/14/2023      No results found for: \"NONUHFIRE\", \"NONUHSWGH\", \"NONUHFISH\", \"EXTHEPBSAG\"  No results found for: \"HBCTI\", \"HEPBCAB\"  Lab Results   Component Value Date    HEPAIGM Nonreactive 11/14/2023    HEPBCIGM Nonreactive 11/14/2023    HEPCAB Nonreactive 07/24/2024        Labs reviewed and patient meets treatment conditions? Yes    DRUG SPECIFIC QUESTIONS:    Any new or worsening signs / symptoms of heart failure which may include things like worsening shortness of breath, swelling, " fatigue? No   (If yes notify provider before proceeding with today's infusion. New onset or worsening HF have been reported with infliximab)    REMINDER:   WEIGHT BASED DRUG     Recommended Vitals/Observation:  Vitals:     Induction: Obtain vital signs every 30 minutes; at end of observation period and as needed.     Maintenance: Obtain vital signs at start and end of infusions  Observation:     Induction: Patient is monitored for 30 minutes post-infusion     Maintenance: No observation.        Weight Based Drug Calculations:    WEIGHT BASED DRUGS: NOT APPLICABLE / FLAT DOSE          Initiated By: Rosalba Cervantes RN

## 2024-12-05 ENCOUNTER — ANESTHESIA EVENT (OUTPATIENT)
Dept: GASTROENTEROLOGY | Facility: EXTERNAL LOCATION | Age: 64
End: 2024-12-05

## 2024-12-12 ENCOUNTER — ANESTHESIA (OUTPATIENT)
Dept: GASTROENTEROLOGY | Facility: EXTERNAL LOCATION | Age: 64
End: 2024-12-12

## 2024-12-12 ENCOUNTER — APPOINTMENT (OUTPATIENT)
Dept: GASTROENTEROLOGY | Facility: EXTERNAL LOCATION | Age: 64
End: 2024-12-12
Payer: COMMERCIAL

## 2024-12-12 ENCOUNTER — TELEPHONE (OUTPATIENT)
Dept: GASTROENTEROLOGY | Facility: CLINIC | Age: 64
End: 2024-12-12

## 2024-12-12 VITALS
DIASTOLIC BLOOD PRESSURE: 93 MMHG | BODY MASS INDEX: 26.11 KG/M2 | RESPIRATION RATE: 14 BRPM | HEIGHT: 73 IN | OXYGEN SATURATION: 95 % | SYSTOLIC BLOOD PRESSURE: 145 MMHG | HEART RATE: 79 BPM | TEMPERATURE: 96.8 F | WEIGHT: 197 LBS

## 2024-12-12 DIAGNOSIS — K50.10 CROHN'S DISEASE OF COLON WITHOUT COMPLICATION (MULTI): Primary | ICD-10-CM

## 2024-12-12 DIAGNOSIS — K50.10 CROHN'S DISEASE OF COLON WITHOUT COMPLICATION (MULTI): ICD-10-CM

## 2024-12-12 DIAGNOSIS — K22.70 BARRETT'S ESOPHAGUS WITHOUT DYSPLASIA: ICD-10-CM

## 2024-12-12 PROCEDURE — 43239 EGD BIOPSY SINGLE/MULTIPLE: CPT | Performed by: STUDENT IN AN ORGANIZED HEALTH CARE EDUCATION/TRAINING PROGRAM

## 2024-12-12 PROCEDURE — 43251 EGD REMOVE LESION SNARE: CPT | Performed by: STUDENT IN AN ORGANIZED HEALTH CARE EDUCATION/TRAINING PROGRAM

## 2024-12-12 PROCEDURE — 45380 COLONOSCOPY AND BIOPSY: CPT | Performed by: STUDENT IN AN ORGANIZED HEALTH CARE EDUCATION/TRAINING PROGRAM

## 2024-12-12 RX ORDER — LIDOCAINE HYDROCHLORIDE 20 MG/ML
INJECTION, SOLUTION INFILTRATION; PERINEURAL AS NEEDED
Status: DISCONTINUED | OUTPATIENT
Start: 2024-12-12 | End: 2024-12-12

## 2024-12-12 RX ORDER — PROPOFOL 10 MG/ML
INJECTION, EMULSION INTRAVENOUS AS NEEDED
Status: DISCONTINUED | OUTPATIENT
Start: 2024-12-12 | End: 2024-12-12

## 2024-12-12 RX ORDER — SODIUM CHLORIDE 9 MG/ML
INJECTION, SOLUTION INTRAVENOUS CONTINUOUS PRN
Status: DISCONTINUED | OUTPATIENT
Start: 2024-12-12 | End: 2024-12-12

## 2024-12-12 SDOH — HEALTH STABILITY: MENTAL HEALTH: CURRENT SMOKER: 0

## 2024-12-12 ASSESSMENT — PAIN - FUNCTIONAL ASSESSMENT
PAIN_FUNCTIONAL_ASSESSMENT: 0-10

## 2024-12-12 ASSESSMENT — COLUMBIA-SUICIDE SEVERITY RATING SCALE - C-SSRS
1. IN THE PAST MONTH, HAVE YOU WISHED YOU WERE DEAD OR WISHED YOU COULD GO TO SLEEP AND NOT WAKE UP?: NO
2. HAVE YOU ACTUALLY HAD ANY THOUGHTS OF KILLING YOURSELF?: NO
6. HAVE YOU EVER DONE ANYTHING, STARTED TO DO ANYTHING, OR PREPARED TO DO ANYTHING TO END YOUR LIFE?: NO

## 2024-12-12 NOTE — ANESTHESIA PREPROCEDURE EVALUATION
Patient: Olayinka Kowalski    Procedure Information       Date/Time: 12/12/24 0900    Scheduled providers: David Tapia DO; Isabel Jay RN    Procedures:       COLONOSCOPY      EGD    Location: [facility]            Relevant Problems   Cardiac   (+) Essential hypertension   (+) Hyperlipidemia   (+) Hypertriglyceridemia      GI   (+) Crohn's colitis (Multi)   (+) Erosive esophagitis   (+) GERD (gastroesophageal reflux disease)   (+) Rectal bleeding      HEENT   (+) Asymmetric SNHL (sensorineural hearing loss)   (+) Sensorineural hearing loss (SNHL) of left ear with unrestricted hearing of right ear       Clinical information reviewed:   Tobacco  Allergies  Meds   Med Hx  Surg Hx   Fam Hx  Soc Hx        NPO Detail:  NPO/Void Status  Date of Last Liquid: 12/12/24  Time of Last Liquid: 0400  Date of Last Solid: 12/10/24  Time of Last Solid: 1800         Physical Exam    Airway  Mallampati: II  TM distance: >3 FB  Neck ROM: full     Cardiovascular - normal exam     Dental - normal exam     Pulmonary - normal exam  Breath sounds clear to auscultation     Abdominal          Anesthesia Plan    History of general anesthesia?: yes  History of complications of general anesthesia?: no    ASA 2     MAC     The patient is not a current smoker.    intravenous induction   Anesthetic plan and risks discussed with patient.    Plan discussed with CRNA.

## 2024-12-12 NOTE — H&P
Outpatient NR Procedure H&P    Patient Profile  Name Olayinka Kowalski  Date of Birth 1960  MRN [id number]  PCP Robert Buchanan        Diagnosis: Follow up of Ennis's esophagus, UC.  Procedure(s):  EGD/Colonoscopy.    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 Authorizing Provider   allopurinol (Zyloprim) 100 mg tablet Take 1 tablet (100 mg) by mouth once daily. 11/5/24 11/5/25  Jorge Lynn MD   atorvastatin (Lipitor) 80 mg tablet TAKE 1 TABLET DAILY (INCREASED) 9/3/24   Henry Steve,    EPINEPHrine 0.3 mg/0.3 mL injection syringe Inject 0.3 mL (0.3 mg) into the muscle 1 time if needed for anaphylaxis for up to 1 dose. As Directed 11/2/23   Henry Stvee,    ergocalciferol (Vitamin D-2) 1.25 MG (96880 UT) capsule Take 1 capsule (1,250 mcg) by mouth every 14 (fourteen) days. 9/18/23   Historical Provider, MD   inFLIXimab (Remicade) 100 mg injection Infuse 900 mg into a venous catheter every 8 (eight) weeks.  For Maintenance: Every 8 weeks 4/22/24    David Tapia DO   magnesium 250 mg tablet Take 1 tablet (250 mg) by mouth once daily at bedtime. 7/25/24 7/25/25  Henry Steve DO   omeprazole (PriLOSEC) 40 mg DR capsule TAKE 1 CAPSULE EVERY MORNING 4/23/24   Henry Steve DO   ramipril (Altace) 1.25 mg capsule Take 2 capsules (2.5 mg) by mouth once daily. 4/24/24 4/24/25  Jorge Lynn MD   triamterene-hydrochlorothiazid (Maxzide-25) 37.5-25 mg tablet TAKE 1 TABLET DAILY (FOLLOW UP 10/13/21) 11/4/24   Harvey Martinez MD   ubidecarenone (COQ-10 ORAL) Take 1 capsule by mouth once daily.    Historical Provider, MD       Physical Exam  Vitals:    12/12/24 0810   Temp: 36.5 °C (97.7 °F)      Weight   Vitals:    12/12/24 0810   Weight: 92.1 kg (203 lb)     BMI Body mass index is 26.78 kg/m².    General: A&Ox3, NAD.  HEENT: AT/NC.   CV: RRR. No murmur.  Resp: CTA bilaterally. No wheezing, rhonchi or rales.   GI: Soft, NT/ND.   Extrem: No edema. Pulses intact.  Skin: No Jaundice.   Neuro: No focal deficits.   Psych: Normal mood and affect.        Oropharyngeal Classification II (hard and soft palate, upper portion of tonsils and uvula visible)  ASA PS Classification 2  Sedation Plan: Deep Sedation.  Procedure Plan - pre-procedural (re)assesment completed by physician:  discharge/transfer patient when discharge criteria met    David Tapia DO  12/12/2024 8:13 AM

## 2024-12-12 NOTE — TELEPHONE ENCOUNTER
----- Message from David Tapia sent at 12/12/2024  9:40 AM EST -----  Regarding: blood work  Can we check TDM, CBC, BMP, LFTs, Vit D, B12

## 2024-12-12 NOTE — ANESTHESIA POSTPROCEDURE EVALUATION
Patient: Olayinka Kowalski    Procedure Summary       Date: 12/12/24 Room / Location: [facility]    Anesthesia Start: 0838 Anesthesia Stop:     Procedures:       COLONOSCOPY      EGD Diagnosis:       Crohn's disease of colon without complication (Multi)      Ennis's esophagus without dysplasia    Scheduled Providers: David Tapia DO; Isabel Jay RN Responsible Provider: FREDRICK Case    Anesthesia Type: MAC ASA Status: 2            Anesthesia Type: MAC    Vitals Value Taken Time   /86 12/12/24 0925   Temp 36 12/12/24 0925   Pulse 84 12/12/24 0925   Resp 17 12/12/24 0925   SpO2 97 12/12/24 0925       Anesthesia Post Evaluation    Patient location during evaluation: bedside  Patient participation: complete - patient cannot participate  Level of consciousness: responsive to verbal stimuli and sedated  Pain score: 0  Pain management: adequate  Airway patency: patent  Cardiovascular status: acceptable and hemodynamically stable  Respiratory status: acceptable  Hydration status: acceptable  Postoperative Nausea and Vomiting: none    No notable events documented.

## 2024-12-12 NOTE — DISCHARGE INSTRUCTIONS

## 2024-12-13 ENCOUNTER — TELEPHONE (OUTPATIENT)
Dept: GASTROENTEROLOGY | Facility: CLINIC | Age: 64
End: 2024-12-13
Payer: COMMERCIAL

## 2024-12-13 NOTE — TELEPHONE ENCOUNTER
Spoke to patient over the phone. He was notified about the lab orders. He will not go prior to 12/17 due to the time sensitivity of predictr PK . He voiced understanding.

## 2024-12-17 ENCOUNTER — APPOINTMENT (OUTPATIENT)
Dept: NEPHROLOGY | Facility: CLINIC | Age: 64
End: 2024-12-17
Payer: COMMERCIAL

## 2024-12-17 ENCOUNTER — LAB (OUTPATIENT)
Dept: LAB | Facility: LAB | Age: 64
End: 2024-12-17
Payer: COMMERCIAL

## 2024-12-17 VITALS
BODY MASS INDEX: 27.2 KG/M2 | SYSTOLIC BLOOD PRESSURE: 109 MMHG | WEIGHT: 205.2 LBS | HEIGHT: 73 IN | HEART RATE: 85 BPM | DIASTOLIC BLOOD PRESSURE: 77 MMHG

## 2024-12-17 DIAGNOSIS — K50.10 CROHN'S DISEASE OF COLON WITHOUT COMPLICATION (MULTI): ICD-10-CM

## 2024-12-17 DIAGNOSIS — N18.31 STAGE 3A CHRONIC KIDNEY DISEASE (MULTI): ICD-10-CM

## 2024-12-17 DIAGNOSIS — I10 ESSENTIAL HYPERTENSION: Primary | ICD-10-CM

## 2024-12-17 LAB
25(OH)D3 SERPL-MCNC: 36 NG/ML (ref 30–100)
ALBUMIN SERPL BCP-MCNC: 4.3 G/DL (ref 3.4–5)
ALP SERPL-CCNC: 68 U/L (ref 33–136)
ALT SERPL W P-5'-P-CCNC: 20 U/L (ref 10–52)
ANION GAP SERPL CALC-SCNC: 13 MMOL/L (ref 10–20)
AST SERPL W P-5'-P-CCNC: 17 U/L (ref 9–39)
BILIRUB DIRECT SERPL-MCNC: 0.1 MG/DL (ref 0–0.3)
BILIRUB SERPL-MCNC: 0.4 MG/DL (ref 0–1.2)
BUN SERPL-MCNC: 20 MG/DL (ref 6–23)
CALCIUM SERPL-MCNC: 9.7 MG/DL (ref 8.6–10.3)
CHLORIDE SERPL-SCNC: 102 MMOL/L (ref 98–107)
CO2 SERPL-SCNC: 28 MMOL/L (ref 21–32)
CREAT SERPL-MCNC: 1.64 MG/DL (ref 0.5–1.3)
EGFRCR SERPLBLD CKD-EPI 2021: 46 ML/MIN/1.73M*2
ERYTHROCYTE [DISTWIDTH] IN BLOOD BY AUTOMATED COUNT: 12.7 % (ref 11.5–14.5)
GLUCOSE SERPL-MCNC: 120 MG/DL (ref 74–99)
HCT VFR BLD AUTO: 46.4 % (ref 41–52)
HGB BLD-MCNC: 14.8 G/DL (ref 13.5–17.5)
MCH RBC QN AUTO: 30.2 PG (ref 26–34)
MCHC RBC AUTO-ENTMCNC: 31.9 G/DL (ref 32–36)
MCV RBC AUTO: 95 FL (ref 80–100)
NRBC BLD-RTO: 0 /100 WBCS (ref 0–0)
PLATELET # BLD AUTO: 318 X10*3/UL (ref 150–450)
POTASSIUM SERPL-SCNC: 4.7 MMOL/L (ref 3.5–5.3)
PROT SERPL-MCNC: 7.2 G/DL (ref 6.4–8.2)
RBC # BLD AUTO: 4.9 X10*6/UL (ref 4.5–5.9)
SODIUM SERPL-SCNC: 138 MMOL/L (ref 136–145)
VIT B12 SERPL-MCNC: 689 PG/ML (ref 211–911)
WBC # BLD AUTO: 7.3 X10*3/UL (ref 4.4–11.3)

## 2024-12-17 PROCEDURE — 3078F DIAST BP <80 MM HG: CPT | Performed by: INTERNAL MEDICINE

## 2024-12-17 PROCEDURE — 80053 COMPREHEN METABOLIC PANEL: CPT

## 2024-12-17 PROCEDURE — 3008F BODY MASS INDEX DOCD: CPT | Performed by: INTERNAL MEDICINE

## 2024-12-17 PROCEDURE — 1036F TOBACCO NON-USER: CPT | Performed by: INTERNAL MEDICINE

## 2024-12-17 PROCEDURE — 3074F SYST BP LT 130 MM HG: CPT | Performed by: INTERNAL MEDICINE

## 2024-12-17 PROCEDURE — 82607 VITAMIN B-12: CPT

## 2024-12-17 PROCEDURE — 82248 BILIRUBIN DIRECT: CPT

## 2024-12-17 PROCEDURE — 82542 COL CHROMOTOGRAPHY QUAL/QUAN: CPT

## 2024-12-17 PROCEDURE — 1159F MED LIST DOCD IN RCRD: CPT | Performed by: INTERNAL MEDICINE

## 2024-12-17 PROCEDURE — 80145 DRUG ASSAY ADALIMUMAB: CPT

## 2024-12-17 PROCEDURE — 85027 COMPLETE CBC AUTOMATED: CPT

## 2024-12-17 PROCEDURE — 82306 VITAMIN D 25 HYDROXY: CPT

## 2024-12-17 PROCEDURE — 36415 COLL VENOUS BLD VENIPUNCTURE: CPT

## 2024-12-17 PROCEDURE — 1123F ACP DISCUSS/DSCN MKR DOCD: CPT | Performed by: INTERNAL MEDICINE

## 2024-12-17 PROCEDURE — 99213 OFFICE O/P EST LOW 20 MIN: CPT | Performed by: INTERNAL MEDICINE

## 2024-12-17 NOTE — PROGRESS NOTES
Olayinka Kowalski   64 y.o.    @WT@  N/Room: [id number]/Room/bed info not found    Subjective:   The patient is being seen for a routine clinic follow-up of chronic kidney disease. Recently, the disease has been stable. Disease complications:  No hyperkalemia, no hypocalcemia, no hyperphosphatemia, no metabolic acidosis, no coagulopathy, no uremic encephalopathy, no neuropathy and no renal osteodystrophy. The patient is currently asymptomatic. No associated symptoms are reported.       Meds:   Current Outpatient Medications   Medication Sig Dispense Refill    allopurinol (Zyloprim) 100 mg tablet Take 1 tablet (100 mg) by mouth once daily. 90 tablet 3    atorvastatin (Lipitor) 80 mg tablet TAKE 1 TABLET DAILY (INCREASED) 90 tablet 3    EPINEPHrine 0.3 mg/0.3 mL injection syringe Inject 0.3 mL (0.3 mg) into the muscle 1 time if needed for anaphylaxis for up to 1 dose. As Directed 1 each 1    ergocalciferol (Vitamin D-2) 1.25 MG (06638 UT) capsule Take 1 capsule (1,250 mcg) by mouth every 14 (fourteen) days.      inFLIXimab (Remicade) 100 mg injection Infuse 900 mg into a venous catheter every 8 (eight) weeks.  For Maintenance: Every 8 weeks 900 mg 4    magnesium 250 mg tablet Take 1 tablet (250 mg) by mouth once daily at bedtime. 90 tablet 3    omeprazole (PriLOSEC) 40 mg DR capsule TAKE 1 CAPSULE EVERY MORNING 90 capsule 3    ramipril (Altace) 1.25 mg capsule Take 2 capsules (2.5 mg) by mouth once daily. 180 capsule 3    triamterene-hydrochlorothiazid (Maxzide-25) 37.5-25 mg tablet TAKE 1 TABLET DAILY (FOLLOW UP 10/13/21) 90 tablet 3    ubidecarenone (COQ-10 ORAL) Take 1 capsule by mouth once daily.       No current facility-administered medications for this visit.          ROS:  The patient is awake and oriented. No dizziness or lightheadedness. No chills and no fever. No headaches. No nausea and no vomiting. No shortness of breath. No cough. No sputum. No chest pain. No chest tightness. No abdominal pain. No diarrhea  and no constipation. No hematemesis or hemoptysis. No hematuria. No rectal bleeding. No melena. No epistaxis. No urinary symptoms. No flank pain. No leg edema. No leg pain. No weakness. No itching. Overall, the rest of the review of systems is also negative.  12 point review of systems otherwise negative as stated in HPI.        Physical Examination:        Vitals:    12/17/24 1516   BP: 109/77   Pulse: 85     General: The patient is awake, oriented, and is not in any distress.  Head and Neck: Normocephalic. No periorbital edema.  Eyes: non-icteric  Respiratory: Symmetric air entry. Symmetric chest expansion.No respiratory distress.  Skin: No maculopapular rash.  Musculoskeletal: No peripheral edema in both left and right upper extremities.  No edema in either left or right lower extremities.  Neuro Exam: Speech is fluent. Moves extremities.    Imaging:  === 12/29/22 ===    US RENAL COMPLETE    - Impression -  No hydronephrosis seen bilaterally.    Right renal parapelvic cyst measuring up to 1.1 x 1.1 cm.    Enlarged, heterogeneous prostate.       Blood Labs:  Results for orders placed or performed in visit on 12/17/24 (from the past 24 hours)   CBC   Result Value Ref Range    WBC 7.3 4.4 - 11.3 x10*3/uL    nRBC 0.0 0.0 - 0.0 /100 WBCs    RBC 4.90 4.50 - 5.90 x10*6/uL    Hemoglobin 14.8 13.5 - 17.5 g/dL    Hematocrit 46.4 41.0 - 52.0 %    MCV 95 80 - 100 fL    MCH 30.2 26.0 - 34.0 pg    MCHC 31.9 (L) 32.0 - 36.0 g/dL    RDW 12.7 11.5 - 14.5 %    Platelets 318 150 - 450 x10*3/uL   Hepatic Function Panel   Result Value Ref Range    Albumin 4.3 3.4 - 5.0 g/dL    Bilirubin, Total 0.4 0.0 - 1.2 mg/dL    Bilirubin, Direct 0.1 0.0 - 0.3 mg/dL    Alkaline Phosphatase 68 33 - 136 U/L    ALT 20 10 - 52 U/L    AST 17 9 - 39 U/L    Total Protein 7.2 6.4 - 8.2 g/dL   Vitamin D 25-Hydroxy,Total (for eval of Vitamin D levels)   Result Value Ref Range    Vitamin D, 25-Hydroxy, Total 36 30 - 100 ng/mL   Vitamin B12   Result Value  Ref Range    Vitamin B12 689 211 - 911 pg/mL   Basic metabolic panel   Result Value Ref Range    Glucose 120 (H) 74 - 99 mg/dL    Sodium 138 136 - 145 mmol/L    Potassium 4.7 3.5 - 5.3 mmol/L    Chloride 102 98 - 107 mmol/L    Bicarbonate 28 21 - 32 mmol/L    Anion Gap 13 10 - 20 mmol/L    Urea Nitrogen 20 6 - 23 mg/dL    Creatinine 1.64 (H) 0.50 - 1.30 mg/dL    eGFR 46 (L) >60 mL/min/1.73m*2    Calcium 9.7 8.6 - 10.3 mg/dL      Lab Results   Component Value Date    PTH 61.3 04/22/2024    PROTUR NEGATIVE 02/24/2023    PROTUR 17 02/24/2023    PHOS 3.8 09/16/2023      Lab Results   Component Value Date    GLUCOSE 120 (H) 12/17/2024    CALCIUM 9.7 12/17/2024     12/17/2024    K 4.7 12/17/2024    CO2 28 12/17/2024     12/17/2024    BUN 20 12/17/2024    CREATININE 1.64 (H) 12/17/2024         Assessment and Plan:    1 chronic kidney disease stage III. Last creatinine level is 1.64. Stable kidney function. Normal electrolytes. Normal bicarb level. Volume status is good.      2. hypertension. Blood pressure is under control. Continue the current meds.     I will see him in about 6 months for follow-up.        Jorge Lynn MD  Senior Attending Physician  Director of Onco-Nephrology Program  Division of Nephrology & Hypertension  [facility]

## 2024-12-23 LAB — SCAN RESULT: NORMAL

## 2025-01-14 ENCOUNTER — TELEPHONE (OUTPATIENT)
Dept: GASTROENTEROLOGY | Facility: CLINIC | Age: 65
End: 2025-01-14
Payer: COMMERCIAL

## 2025-01-14 NOTE — TELEPHONE ENCOUNTER
Patient called in stating he received a large EOB for his Remicade . He has been in contact with his insurance company . I have also reached out to the auth team .       Pt is also inquiring about recent lab work. Will reach out to Dr. Tapia.

## 2025-01-21 ENCOUNTER — DOCUMENTATION (OUTPATIENT)
Dept: GASTROENTEROLOGY | Facility: CLINIC | Age: 65
End: 2025-01-21
Payer: COMMERCIAL

## 2025-01-21 DIAGNOSIS — K50.111 CROHN'S DISEASE OF COLON WITH RECTAL BLEEDING (MULTI): Primary | ICD-10-CM

## 2025-01-21 DIAGNOSIS — K50.10 CROHN'S DISEASE OF COLON WITHOUT COMPLICATION (MULTI): ICD-10-CM

## 2025-01-21 RX ORDER — DIPHENHYDRAMINE HYDROCHLORIDE 50 MG/ML
50 INJECTION INTRAMUSCULAR; INTRAVENOUS AS NEEDED
OUTPATIENT
Start: 2025-01-21

## 2025-01-21 RX ORDER — ALBUTEROL SULFATE 0.83 MG/ML
3 SOLUTION RESPIRATORY (INHALATION) AS NEEDED
OUTPATIENT
Start: 2025-01-21

## 2025-01-21 RX ORDER — FAMOTIDINE 10 MG/ML
20 INJECTION INTRAVENOUS ONCE AS NEEDED
OUTPATIENT
Start: 2025-01-21

## 2025-01-21 RX ORDER — EPINEPHRINE 0.3 MG/.3ML
0.3 INJECTION SUBCUTANEOUS EVERY 5 MIN PRN
OUTPATIENT
Start: 2025-01-21

## 2025-01-21 NOTE — PROGRESS NOTES
Olayinka Kowalski is a 63yo male with a PMH of HTN, HLD, CKD, MATTHEW, colon polyps, Ennis's esophagus, GERD, and Ménière's disease and in whom we are following for Crohn's colitis.  Patient had been doing well on infliximab, but started to develop subtle symptoms.  We checked TDM which showed low drug level low antibodies.  We increased the infliximab to 10 mg/kg and started Imuran.  However, patient did not tolerate the Imuran.  Patient with continued subtle symptoms and we repeated TDM.  It now shows low drug level, high antibodies.  Colonoscopy with inflammation of the sigmoid colon.  Symptoms now getting worse with some rectal bleeding.  We have decided to switch infliximab to Skyrizi.

## 2025-01-22 ENCOUNTER — APPOINTMENT (OUTPATIENT)
Dept: INFUSION THERAPY | Facility: CLINIC | Age: 65
End: 2025-01-22
Payer: COMMERCIAL

## 2025-01-22 ENCOUNTER — SPECIALTY PHARMACY (OUTPATIENT)
Dept: PHARMACY | Facility: CLINIC | Age: 65
End: 2025-01-22

## 2025-01-22 DIAGNOSIS — K50.10 CROHN'S DISEASE OF COLON WITHOUT COMPLICATION (MULTI): ICD-10-CM

## 2025-01-22 DIAGNOSIS — K50.10 CROHN'S DISEASE OF COLON WITHOUT COMPLICATION (MULTI): Primary | ICD-10-CM

## 2025-01-22 RX ORDER — PREDNISONE 20 MG/1
TABLET ORAL
Qty: 39 TABLET | Refills: 0 | Status: SHIPPED | OUTPATIENT
Start: 2025-01-22 | End: 2025-02-22

## 2025-01-22 RX ORDER — PREDNISONE 20 MG/1
TABLET ORAL
Qty: 39 TABLET | Refills: 0 | Status: SHIPPED | OUTPATIENT
Start: 2025-01-22 | End: 2025-01-22 | Stop reason: SDUPTHER

## 2025-01-28 ENCOUNTER — TELEMEDICINE CLINICAL SUPPORT (OUTPATIENT)
Dept: PHARMACY | Facility: HOSPITAL | Age: 65
End: 2025-01-28
Payer: COMMERCIAL

## 2025-02-19 ENCOUNTER — APPOINTMENT (OUTPATIENT)
Dept: INFUSION THERAPY | Facility: CLINIC | Age: 65
End: 2025-02-19
Payer: COMMERCIAL

## 2025-02-19 VITALS
TEMPERATURE: 97.8 F | SYSTOLIC BLOOD PRESSURE: 148 MMHG | HEART RATE: 81 BPM | OXYGEN SATURATION: 97 % | DIASTOLIC BLOOD PRESSURE: 89 MMHG | RESPIRATION RATE: 16 BRPM

## 2025-02-19 DIAGNOSIS — K50.111 CROHN'S DISEASE OF COLON WITH RECTAL BLEEDING (MULTI): ICD-10-CM

## 2025-02-19 PROCEDURE — 96365 THER/PROPH/DIAG IV INF INIT: CPT | Performed by: REGISTERED NURSE

## 2025-02-19 RX ORDER — ALBUTEROL SULFATE 0.83 MG/ML
3 SOLUTION RESPIRATORY (INHALATION) AS NEEDED
OUTPATIENT
Start: 2025-03-19

## 2025-02-19 RX ORDER — FAMOTIDINE 10 MG/ML
20 INJECTION, SOLUTION INTRAVENOUS ONCE AS NEEDED
OUTPATIENT
Start: 2025-03-19

## 2025-02-19 RX ORDER — DIPHENHYDRAMINE HYDROCHLORIDE 50 MG/ML
50 INJECTION INTRAMUSCULAR; INTRAVENOUS AS NEEDED
OUTPATIENT
Start: 2025-03-19

## 2025-02-19 RX ORDER — EPINEPHRINE 0.3 MG/.3ML
0.3 INJECTION SUBCUTANEOUS EVERY 5 MIN PRN
OUTPATIENT
Start: 2025-03-19

## 2025-02-19 ASSESSMENT — ENCOUNTER SYMPTOMS
MYALGIAS: 0
BLOOD IN STOOL: 1
APPETITE CHANGE: 0
NAUSEA: 0
TROUBLE SWALLOWING: 0
VOICE CHANGE: 0
DIARRHEA: 1
UNEXPECTED WEIGHT CHANGE: 0
SORE THROAT: 0
HEMATURIA: 0
FATIGUE: 0
CHILLS: 0
NUMBNESS: 0
COUGH: 0
CONSTIPATION: 0
EXTREMITY WEAKNESS: 0
PALPITATIONS: 0
DYSURIA: 0
WOUND: 0
ARTHRALGIAS: 0
DIZZINESS: 0
WHEEZING: 0
EYE PROBLEMS: 0
SHORTNESS OF BREATH: 0
LIGHT-HEADEDNESS: 0
FEVER: 0
LEG SWELLING: 0
HEADACHES: 0
FREQUENCY: 0
ABDOMINAL PAIN: 0
VOMITING: 0

## 2025-02-19 ASSESSMENT — PAIN SCALES - GENERAL: PAINLEVEL_OUTOF10: 0-NO PAIN

## 2025-02-19 NOTE — PROGRESS NOTES
[id number]   Infusion Clinic Note   Date: 2025   Name: Olayinka Kowalski  : 1960   MRN: [id number]         Reason for Visit: OP Infusion (Skyrizi 600 mg infusion)         Today: We administered risankizumab-rzaa (Skyrizi) 600 mg in dextrose 5% 260 mL IV.       Ordered By: David Tapia       For a Diagnosis of: Crohn's disease of colon with rectal bleeding (Multi)       At today's visit patient accompanied by: Self      Vitals:   Vitals:    25 0715   BP: 148/89   Pulse: 81   Resp: 16   Temp: 36.6 °C (97.8 °F)   SpO2: 97%   PainSc: 0-No pain             Pre - Treatment Checklist:      - Previous reaction to current treatment: no      Assess patient for the concerns below. Document provider notification as appropriate.  - Active or recent infection with/without current antibiotic use: no  - Recent or planned invasive dental work: no  - Recent or planned surgeries: no  - Recently received or plans to receive vaccinations: no  - Has treatment related toxicities: no  - Any chance may be pregnant:  no      Pain: 0   - Is the pain different from normal: no   - Is prescribing Doctor aware:  n/a      Labs: N/A      Fall Risk Screening: Mark Fall Risk  History of Falling, Immediate or Within 3 Months: No  Secondary Diagnosis: No  Ambulatory Aid: Walks without aid/bedrest/nurse assist  Intravenous Therapy/Heparin Lock: No  Gait/Transferring: Normal/bedrest/immobile  Mental Status: Oriented to own ability  Mark Fall Risk Score: 0       Review Of Systems:  Review of Systems   Constitutional:  Negative for appetite change, chills, fatigue, fever and unexpected weight change.   HENT:   Negative for hearing loss, mouth sores, sore throat, tinnitus, trouble swallowing and voice change.    Eyes:  Negative for eye problems.   Respiratory:  Negative for cough, shortness of breath and wheezing.    Cardiovascular:  Negative for chest pain, leg swelling  "and palpitations.   Gastrointestinal:  Positive for blood in stool and diarrhea. Negative for abdominal pain, constipation, nausea and vomiting.   Genitourinary:  Negative for dysuria, frequency and hematuria.    Musculoskeletal:  Negative for arthralgias and myalgias.   Skin:  Negative for itching, rash and wound.   Neurological:  Negative for dizziness, extremity weakness, headaches, light-headedness and numbness.         Infusion Readiness:  - Assessment Concerns Related to Infusion: No  - Provider notified: n/a      New Patient Education:    NEW PATIENT MEDICATION EDUCATION PT PROVIDED WITH WRITTEN ([employer] PT EDUCATION SHEET) AND VERBAL EDUCATION REGARDING MEDICATION GIVEN. VERIFIED MEDICATION NAME WITH PATIENT AND DISCUSSED REASON FOR USE. BRIEFLY DISCUSSED HOW MEDICATION WORKS AND EDUCATED ON GOAL OF TREATMENT, FREQUENCY OF TREATMENT, ADVERSE RXN'S AND COMMON SIDE EFFECTS TO MONITOR FOR. INSTRUCTED PT TO ASSURE THAT ALL PROVIDERS INCLUDING DENTISTS ARE AWARE OF MEDICATION RECEIVED. DISCUSSED FLOW OF VISIT AND ORIENTED TO INFUSION CENTER. PT VERBALIZES UNDERSTANDING. CALL LIGHT PROVIDED AND PT AWARE TO ALERT STAFF OF ANY CONCERNS DURING TREATMENT.        Treatment Conditions & Drug Specific Questions:    Risankizumab  (SKYRIZI)   (Unless otherwise specified on patient specific therapy plan):     TREATMENT CONDITIONS:   Unless otherwise specified on patient specific therapy plan HOLD and notify provider prior to proceeding with treatment if:   o Positive Hepatitis B Surface Ag  o Positive T-Spot  o Signs or symptoms of hepatotoxicity  o Elevated AST / ALT  o Total Bilirubin GREATER THAN 2.2 TIMES ULN  - Positive Pregnancy    Lab Results   Component Value Date    HEPBSAG Nonreactive 11/14/2023      Lab Results   Component Value Date    TBSIN Negative 11/14/2023      No results found for: \"NONUHFIRE\", \"NONUHSWGH\", \"NONUHFISH\", \"EXTHEPBSAG\"    Chemistry    Lab Results   Component Value Date/Time     " 12/17/2024 0639    K 4.7 12/17/2024 0639     12/17/2024 0639    CO2 28 12/17/2024 0639    BUN 20 12/17/2024 0639    CREATININE 1.64 (H) 12/17/2024 0639    Lab Results   Component Value Date/Time    CALCIUM 9.7 12/17/2024 0639    ALKPHOS 68 12/17/2024 0639    AST 17 12/17/2024 0639    ALT 20 12/17/2024 0639    BILITOT 0.4 12/17/2024 0639          Lab Results   Component Value Date    ALT 20 12/17/2024    AST 17 12/17/2024    ALKPHOS 68 12/17/2024    BILITOT 0.4 12/17/2024        Patient meets treatment conditions? Yes    REMINDER:  *Negative pregnancy test prior to first infusion in patients of childbearing ability.     Infusion 1: Provide patient with Yedda patient education sheet and review. Assist pt in connecting with nurse navigator if not already connected (pt to call [phone]).    Infusion 2: Assure patient is established with nurse navigator in the community. Have patient watch AWID OBI instructional video. ([email or web address]/AWID-[email or web address]-resources-to-stay-on-track#obi-training-video)    Infusion 3: Complete on body injection training. Instruct patient on timing of first self injection. Typically due 4 weeks from 3rd infusion and then every 8 weeks thereafter (1st injection due 12 weeks from week 0 infusion). Refer to individual script.          - Completed? Yes. Week 1 training complete    Recommended Vitals/Observation:  Vitals: Obtain vital signs at start and end of infusion.   Observation: Patient may leave immediately after        Weight Based Drug Calculations:    WEIGHT BASED DRUGS: NOT APPLICABLE / FLAT DOSE       Post Treatment: Patient tolerated treatment without issue and was discharged in no apparent distress.      Note Authored / Patient Cared for By: Krista Joshua RN        Normal rate, regular rhythm.  Heart sounds S1, S2.  No murmurs, rubs or gallops. right rail up

## 2025-02-19 NOTE — PATIENT INSTRUCTIONS
Today :We administered risankizumab-rzaa (Skyrizi) 600 mg in dextrose 5% 260 mL IV.     For:   1. Crohn's disease of colon with rectal bleeding (Multi)         Your next appointment is due in:  28 days        Please read the  Medication Guide that was given to you and reviewed during todays visit.     (Tell all doctors including dentists that you are taking this medication)     Go to the emergency room or call 911 if:  -You have signs of allergic reaction:   -Rash, hives, itching.   -Swollen, blistered, peeling skin.   -Swelling of face, lips, mouth, tongue or throat.   -Tightness of chest, trouble breathing, swallowing or talking     Call your doctor:  - If IV / injection site gets red, warm, swollen, itchy or leaks fluid or pus.     (Leave dressing on your IV site for at least 2 hours and keep area clean and dry  - If you get sick or have symptoms of infection or are not feeling well for any reason.    (Wash your hands often, stay away from people who are sick)  - If you have side effects from your medication that do not go away or are bothersome.     (Refer to the teaching your nurse gave you for side effects to call your doctor about)    - Common side effects may include:  stuffy nose, headache, feeling tired, muscle aches, upset stomach  - Before receiving any vaccines     - Call the [facility] at   If:  - You get sick, are on antibiotics, have had a recent vaccine, have surgery or dental work and your doctor wants your visit rescheduled.  - You need to cancel and reschedule your visit for any reason. Call at least 2 days before your visit if you need to cancel.   - Your insurance changes before your next visit.    (We will need to get approval from your new insurance. This can take up to two weeks.)     The [facility] is opened Monday thru Friday. We are closed on weekends and holidays.   Voice mail will take your call if the center is closed. If you leave a message please  allow 24 hours for a call back during weekdays. If you leave a message on a weekend/holiday, we will call you back the next business day.    A pharmacist is available Monday - Friday from 8:30AM to 3:30PM to help answer any questions you may have about your prescriptions(s). Please call pharmacy at:    [facility]: [phone]  [facility]: [phone]  [facility]: [phone]

## 2025-02-26 ENCOUNTER — PATIENT MESSAGE (OUTPATIENT)
Dept: GASTROENTEROLOGY | Facility: CLINIC | Age: 65
End: 2025-02-26
Payer: COMMERCIAL

## 2025-03-04 ENCOUNTER — APPOINTMENT (OUTPATIENT)
Dept: INFUSION THERAPY | Facility: CLINIC | Age: 65
End: 2025-03-04
Payer: COMMERCIAL

## 2025-03-18 ENCOUNTER — APPOINTMENT (OUTPATIENT)
Dept: INFUSION THERAPY | Facility: CLINIC | Age: 65
End: 2025-03-18
Payer: COMMERCIAL

## 2025-03-18 VITALS
TEMPERATURE: 97.9 F | DIASTOLIC BLOOD PRESSURE: 76 MMHG | HEART RATE: 76 BPM | BODY MASS INDEX: 26.63 KG/M2 | RESPIRATION RATE: 18 BRPM | OXYGEN SATURATION: 97 % | WEIGHT: 201.83 LBS | SYSTOLIC BLOOD PRESSURE: 115 MMHG

## 2025-03-18 DIAGNOSIS — K50.111 CROHN'S DISEASE OF COLON WITH RECTAL BLEEDING (MULTI): ICD-10-CM

## 2025-03-18 PROCEDURE — 96365 THER/PROPH/DIAG IV INF INIT: CPT | Performed by: NURSE PRACTITIONER

## 2025-03-18 RX ORDER — EPINEPHRINE 0.3 MG/.3ML
0.3 INJECTION SUBCUTANEOUS EVERY 5 MIN PRN
OUTPATIENT
Start: 2025-03-19

## 2025-03-18 RX ORDER — FAMOTIDINE 10 MG/ML
20 INJECTION, SOLUTION INTRAVENOUS ONCE AS NEEDED
OUTPATIENT
Start: 2025-03-19

## 2025-03-18 RX ORDER — DIPHENHYDRAMINE HYDROCHLORIDE 50 MG/ML
50 INJECTION, SOLUTION INTRAMUSCULAR; INTRAVENOUS AS NEEDED
OUTPATIENT
Start: 2025-03-19

## 2025-03-18 RX ORDER — ALBUTEROL SULFATE 0.83 MG/ML
3 SOLUTION RESPIRATORY (INHALATION) AS NEEDED
OUTPATIENT
Start: 2025-03-19

## 2025-03-18 ASSESSMENT — ENCOUNTER SYMPTOMS
NUMBNESS: 0
VOICE CHANGE: 0
TROUBLE SWALLOWING: 0
SHORTNESS OF BREATH: 0
PALPITATIONS: 0
RESPIRATORY NEGATIVE: 1
CONSTITUTIONAL NEGATIVE: 1
DIARRHEA: 0
CONSTIPATION: 0
NAUSEA: 0
ABDOMINAL PAIN: 0
NEUROLOGICAL NEGATIVE: 1
SORE THROAT: 0
VOMITING: 0
EYE PROBLEMS: 0
MUSCULOSKELETAL NEGATIVE: 1
HEADACHES: 0
EYES NEGATIVE: 1
LIGHT-HEADEDNESS: 0
WOUND: 0
APPETITE CHANGE: 0
MYALGIAS: 0
COUGH: 0
FEVER: 0
WHEEZING: 0
DIZZINESS: 0
EXTREMITY WEAKNESS: 0
DYSURIA: 0
CHILLS: 0
FATIGUE: 0
CARDIOVASCULAR NEGATIVE: 1
LEG SWELLING: 0
BLOOD IN STOOL: 1
ARTHRALGIAS: 0
FREQUENCY: 0
UNEXPECTED WEIGHT CHANGE: 0
HEMATURIA: 0

## 2025-03-18 NOTE — PROGRESS NOTES
[facility]   Infusion Clinic Note   Date: 2025   Name: Olayinka Kowalski  : 1960   MRN: [id number]         Reason for Visit: OP Infusion (600 mg Skyrizi Infusion)         Today: Olayinka Kowalski had no medications administered during this visit.       Ordered By: David Tapia       For a Diagnosis of: Crohn's disease of colon with rectal bleeding (Multi)       At today's visit patient accompanied by: Self      Today's Vitals:   Vitals:    25 0700   BP: 115/76   Pulse: 76   Resp: 18   Temp: 36.6 °C (97.9 °F)   SpO2: 97%   Weight: 91.6 kg (201 lb 13.3 oz)             Pre - Treatment Checklist:      - Previous reaction to current treatment: no      (Assess patient for the concerns below. Document provider notification as appropriate).  - Active or recent infection with/without current antibiotic use: no  - Recent or planned invasive dental work: no  - Recent or planned surgeries: no  - Recently received or plans to receive vaccinations: no  - Has treatment related toxicities: no  - Any chance may be pregnant:  no      Pain: 0   - Is the pain different from normal: no   - Is prescribing Doctor aware:  n/a      Labs: N/A      Fall Risk Screening: Mark Fall Risk  History of Falling, Immediate or Within 3 Months: No  Secondary Diagnosis: No  Ambulatory Aid: Walks without aid/bedrest/nurse assist  Intravenous Therapy/Heparin Lock: No  Gait/Transferring: Normal/bedrest/immobile  Mental Status: Oriented to own ability  Mark Fall Risk Score: 0       Review Of Systems:  Review of Systems   Constitutional: Negative.  Negative for appetite change, chills, fatigue, fever and unexpected weight change.   HENT:  Negative.  Negative for hearing loss, mouth sores, sore throat, tinnitus, trouble swallowing and voice change.    Eyes: Negative.  Negative for eye problems.   Respiratory: Negative.  Negative for cough, shortness of breath and wheezing.    Cardiovascular: Negative.  Negative  "for chest pain, leg swelling and palpitations.   Gastrointestinal:  Positive for blood in stool. Negative for abdominal pain, constipation, diarrhea, nausea and vomiting.   Genitourinary: Negative.  Negative for dysuria, frequency and hematuria.    Musculoskeletal: Negative.  Negative for arthralgias and myalgias.   Skin: Negative.  Negative for itching, rash and wound.   Neurological: Negative.  Negative for dizziness, extremity weakness, headaches, light-headedness and numbness.         Infusion Readiness:  - Assessment Concerns Related to Infusion: No  - Provider notified: n/a      New Patient Education:    N/A (returning patient for continuation of therapy. Ongoing education provided as needed.)        Treatment Conditions & Drug Specific Questions:    Risankizumab  (SKYRIZI)   (Unless otherwise specified on patient specific therapy plan):     TREATMENT CONDITIONS:   Unless otherwise specified on patient specific therapy plan HOLD and notify provider prior to proceeding with treatment if:   o Positive Hepatitis B Surface Ag  o Positive T-Spot  o Signs or symptoms of hepatotoxicity  o Elevated AST / ALT  o Total Bilirubin GREATER THAN 2.2 TIMES ULN  - Positive Pregnancy    Lab Results   Component Value Date    HEPBSAG Nonreactive 11/14/2023      Lab Results   Component Value Date    TBSIN Negative 11/14/2023      No results found for: \"NONUHFIRE\", \"NONUHSWGH\", \"NONUHFISH\", \"EXTHEPBSAG\"    Chemistry    Lab Results   Component Value Date/Time     12/17/2024 0639    K 4.7 12/17/2024 0639     12/17/2024 0639    CO2 28 12/17/2024 0639    BUN 20 12/17/2024 0639    CREATININE 1.64 (H) 12/17/2024 0639    Lab Results   Component Value Date/Time    CALCIUM 9.7 12/17/2024 0639    ALKPHOS 68 12/17/2024 0639    AST 17 12/17/2024 0639    ALT 20 12/17/2024 0639    BILITOT 0.4 12/17/2024 0639          Lab Results   Component Value Date    ALT 20 12/17/2024    AST 17 12/17/2024    ALKPHOS 68 12/17/2024    BILITOT 0.4 " 12/17/2024        Patient meets treatment conditions? Yes    REMINDER:  *Negative pregnancy test prior to first infusion in patients of childbearing ability.     Infusion 1: Provide patient with [employer] patient education sheet and review. Assist pt in connecting with nurse navigator if not already connected (pt to call [phone]).    Infusion 2: Assure patient is established with nurse navigator in the community. Have patient watch [employer] instructional video. (https://[email or web address]-complete/landing/crohns-resources-to-stay-on-track#obi-training-video)    Infusion 3: Complete on body injection training. Instruct patient on timing of first self injection. Typically due 4 weeks from 3rd infusion and then every 8 weeks thereafter (1st injection due 12 weeks from week 0 infusion). Refer to individual script.          - Completed? Yes. Week 2 training complete    Recommended Vitals/Observation:  Vitals: Obtain vital signs at start and end of infusion.   Observation: Patient may leave immediately after        Weight Based Drug Calculations:    WEIGHT BASED DRUGS: NOT APPLICABLE / FLAT DOSE       Post Treatment: Patient tolerated treatment without issue and was discharged in no apparent distress.      Note Authored / Patient Cared for By: Krista Joshua RN

## 2025-03-18 NOTE — PATIENT INSTRUCTIONS
Today :Olaynika Kowalski had no medications administered during this visit.     For:   1. Crohn's disease of colon with rectal bleeding (Multi)         Your next appointment is due in:  4 weeks        Please read the  Medication Guide that was given to you and reviewed during todays visit.     (Tell all doctors including dentists that you are taking this medication)     Go to the emergency room or call 911 if:  -You have signs of allergic reaction:   -Rash, hives, itching.   -Swollen, blistered, peeling skin.   -Swelling of face, lips, mouth, tongue or throat.   -Tightness of chest, trouble breathing, swallowing or talking     Call your doctor:  - If IV / injection site gets red, warm, swollen, itchy or leaks fluid or pus.     (Leave dressing on your IV site for at least 2 hours and keep area clean and dry  - If you get sick or have symptoms of infection or are not feeling well for any reason.    (Wash your hands often, stay away from people who are sick)  - If you have side effects from your medication that do not go away or are bothersome.     (Refer to the teaching your nurse gave you for side effects to call your doctor about)    - Common side effects may include:  stuffy nose, headache, feeling tired, muscle aches, upset stomach  - Before receiving any vaccines     - Call [facility] at   If:  - You get sick, are on antibiotics, have had a recent vaccine, have surgery or dental work and your doctor wants your visit rescheduled.  - You need to cancel and reschedule your visit for any reason. Call at least 2 days before your visit if you need to cancel.   - Your insurance changes before your next visit.    (We will need to get approval from your new insurance. This can take up to two weeks.)     The [facility] is opened Monday thru Friday. We are closed on weekends and holidays.   Voice mail will take your call if the center is closed. If you leave a message please allow 24 hours  for a call back during weekdays. If you leave a message on a weekend/holiday, we will call you back the next business day.    A pharmacist is available Monday - Friday from 8:30AM to 3:30PM to help answer any questions you may have about your prescriptions(s). Please call pharmacy at:    [facility]: [phone]  [facility]: [phone]  [facility]: [phone]

## 2025-04-01 ENCOUNTER — APPOINTMENT (OUTPATIENT)
Dept: INFUSION THERAPY | Facility: CLINIC | Age: 65
End: 2025-04-01
Payer: COMMERCIAL

## 2025-04-15 ENCOUNTER — APPOINTMENT (OUTPATIENT)
Dept: INFUSION THERAPY | Facility: CLINIC | Age: 65
End: 2025-04-15
Payer: COMMERCIAL

## 2025-04-15 VITALS
TEMPERATURE: 97 F | HEART RATE: 64 BPM | DIASTOLIC BLOOD PRESSURE: 80 MMHG | SYSTOLIC BLOOD PRESSURE: 118 MMHG | OXYGEN SATURATION: 100 % | RESPIRATION RATE: 16 BRPM

## 2025-04-15 DIAGNOSIS — K50.111 CROHN'S DISEASE OF COLON WITH RECTAL BLEEDING (MULTI): ICD-10-CM

## 2025-04-15 PROCEDURE — 96365 THER/PROPH/DIAG IV INF INIT: CPT | Performed by: NURSE PRACTITIONER

## 2025-04-15 RX ORDER — FAMOTIDINE 10 MG/ML
20 INJECTION, SOLUTION INTRAVENOUS ONCE AS NEEDED
Status: CANCELLED | OUTPATIENT
Start: 2025-05-13

## 2025-04-15 RX ORDER — ALBUTEROL SULFATE 0.83 MG/ML
3 SOLUTION RESPIRATORY (INHALATION) AS NEEDED
Status: CANCELLED | OUTPATIENT
Start: 2025-05-13

## 2025-04-15 RX ORDER — DIPHENHYDRAMINE HYDROCHLORIDE 50 MG/ML
50 INJECTION, SOLUTION INTRAMUSCULAR; INTRAVENOUS AS NEEDED
Status: CANCELLED | OUTPATIENT
Start: 2025-05-13

## 2025-04-15 RX ORDER — EPINEPHRINE 0.3 MG/.3ML
0.3 INJECTION SUBCUTANEOUS EVERY 5 MIN PRN
Status: CANCELLED | OUTPATIENT
Start: 2025-05-13

## 2025-04-15 ASSESSMENT — ENCOUNTER SYMPTOMS
EXTREMITY WEAKNESS: 0
PALPITATIONS: 0
FREQUENCY: 0
UNEXPECTED WEIGHT CHANGE: 0
HEMATURIA: 0
VOMITING: 0
WOUND: 0
TROUBLE SWALLOWING: 0
SHORTNESS OF BREATH: 0
LIGHT-HEADEDNESS: 0
ABDOMINAL PAIN: 0
BLOOD IN STOOL: 1
FEVER: 0
NAUSEA: 0
DIARRHEA: 0
CONSTIPATION: 0
VOICE CHANGE: 0
DYSURIA: 0
HEADACHES: 0
MYALGIAS: 0
ARTHRALGIAS: 0
NUMBNESS: 0
LEG SWELLING: 0
SORE THROAT: 0
CHILLS: 0
WHEEZING: 0
DIZZINESS: 0
BRUISES/BLEEDS EASILY: 0
COUGH: 0
EYE PROBLEMS: 0
FATIGUE: 0
APPETITE CHANGE: 0

## 2025-04-15 NOTE — PROGRESS NOTES
[facility]   Infusion Clinic Note   Date: April 15, 2025   Name: Olayinka Kowalski  : 1960   MRN: [id number]         Reason for Visit: OP Infusion (PATIENT HERE FOR HIS SKYRIZI 600MG INFUSION 3RD DOSE)         Today: We administered risankizumab-rzaa (Skyrizi) 600 mg in dextrose 5% 260 mL IV.       Ordered By: David Tapia*       For a Diagnosis of: Crohn's disease of colon with rectal bleeding (Multi)       At today's visit patient accompanied by: Self      Today's Vitals:       Vitals:    04/15/25 0755 04/15/25 1025   BP: 114/79 118/80   Pulse: 68 64   Resp: 18 16   Temp: 36.1 °C (97 °F) 36.1 °C (97 °F)   SpO2: 99% 100%         Pre - Treatment Checklist:      - Previous reaction to current treatment: no      (Assess patient for the concerns below. Document provider notification as appropriate).  - Active or recent infection with/without current antibiotic use: no  - Recent or planned invasive dental work: no  - Recent or planned surgeries: no  - Recently received or plans to receive vaccinations: no  - Has treatment related toxicities: no  - Any chance may be pregnant:  n/a      Pain: 0   - Is the pain different from normal: n/a   - Is prescribing Doctor aware:  n/a      Labs: Reviewed       Fall Risk Screening: Jas Fall Risk  History of Falling, Immediate or Within 3 Months: No  Secondary Diagnosis: No  Ambulatory Aid: Walks without aid/bedrest/nurse assist  Intravenous Therapy/Heparin Lock: No  Gait/Transferring: Normal/bedrest/immobile  Mental Status: Oriented to own ability  Mark Fall Risk Score: 0       Review Of Systems:  Review of Systems   Constitutional:  Negative for appetite change, chills, fatigue, fever and unexpected weight change.   HENT:   Negative for hearing loss, mouth sores, sore throat, tinnitus, trouble swallowing and voice change.    Eyes:  Negative for eye problems.   Respiratory:  Negative for cough, shortness of breath and wheezing.   "  Cardiovascular:  Negative for chest pain, leg swelling and palpitations.   Gastrointestinal:  Positive for blood in stool. Negative for abdominal pain, constipation, diarrhea, nausea and vomiting.   Genitourinary:  Negative for dysuria, frequency and hematuria.    Musculoskeletal:  Negative for arthralgias and myalgias.   Skin:  Negative for itching, rash and wound.   Neurological:  Negative for dizziness, extremity weakness, headaches, light-headedness and numbness.   Hematological:  Does not bruise/bleed easily.         Infusion Readiness:  - Assessment Concerns Related to Infusion: No  - Provider notified: n/a      New Patient Education:    N/A (returning patient for continuation of therapy. Ongoing education provided as needed.)        Treatment Conditions & Drug Specific Questions:    Risankizumab  (SKYRIZI)   (Unless otherwise specified on patient specific therapy plan):     TREATMENT CONDITIONS:   Unless otherwise specified on patient specific therapy plan HOLD and notify provider prior to proceeding with treatment if:   o Positive Hepatitis B Surface Ag  o Positive T-Spot  o Signs or symptoms of hepatotoxicity  o Elevated AST / ALT  o Total Bilirubin GREATER THAN 2.2 TIMES ULN  - Positive Pregnancy    Lab Results   Component Value Date    HEPBSAG Nonreactive 11/14/2023      Lab Results   Component Value Date    TBSIN Negative 11/14/2023      No results found for: \"NONUHFIRE\", \"NONUHSWGH\", \"NONUHFISH\", \"EXTHEPBSAG\"    Chemistry    Lab Results   Component Value Date/Time     12/17/2024 0639    K 4.7 12/17/2024 0639     12/17/2024 0639    CO2 28 12/17/2024 0639    BUN 20 12/17/2024 0639    CREATININE 1.64 (H) 12/17/2024 0639    Lab Results   Component Value Date/Time    CALCIUM 9.7 12/17/2024 0639    ALKPHOS 68 12/17/2024 0639    AST 17 12/17/2024 0639    ALT 20 12/17/2024 0639    BILITOT 0.4 12/17/2024 0639          Lab Results   Component Value Date    ALT 20 12/17/2024    AST 17 12/17/2024    " ALKPHOS 68 12/17/2024    BILITOT 0.4 12/17/2024        Patient meets treatment conditions? Yes    REMINDER:  *Negative pregnancy test prior to first infusion in patients of childbearing ability.     Infusion 1: Provide patient with [employer] patient education sheet and review. Assist pt in connecting with nurse navigator if not already connected (pt to call [phone]).    Infusion 2: Assure patient is established with nurse navigator in the community. Have patient watch [employer] instructional video. (https://[email or web address]-complete/landing/crohns-resources-to-stay-on-track#obi-training-video)    Infusion 3: Complete on body injection training. Instruct patient on timing of first self injection. Typically due 4 weeks from 3rd infusion and then every 8 weeks thereafter (1st injection due 12 weeks from week 0 infusion). Refer to individual script.          - Completed? Yes. Week 3 training complete     Recommended Vitals/Observation:  Vitals: Obtain vital signs at start and end of infusion.   Observation: Patient may leave immediately after        Weight Based Drug Calculations:    WEIGHT BASED DRUGS: NOT APPLICABLE / FLAT DOSE       Post Treatment: Patient tolerated treatment without issue and was discharged in no apparent distress.      Note Authored / Patient Cared for By: Za Wyatt RN

## 2025-04-15 NOTE — PATIENT INSTRUCTIONS
Today you received:  SKYRIZI 600MG INFUSION 3RD DOSE        For:    1. Crohn's disease of colon with rectal bleeding (Multi)            Please read the  Medication Guide that was given to you and reviewed during todays visit.     (Tell all doctors including dentists that you are taking this medication)     Go to the emergency room or call 911 if:  -You have signs of allergic reaction:   o         Rash, hives, itching.   o         Swollen, blistered, peeling skin.   o         Swelling of face, lips, mouth, tongue or throat.   o         Tightness of chest, trouble breathing, swallowing or talking      Call your doctor:     - If IV / injection site gets red, warm, swollen, itchy or leaks fluid or pus.     (Leave dressing on your IV site for at least 2 hours and keep area clean and dry    - If you get sick or have symptoms of infection or are not feeling well for any reason.    (Wash your hands often, stay away from people who are sick)    - If you have side effects from your medication that do not go away or are bothersome.     (Refer to the teaching your nurse gave you for side effects to call your doctor about)     Common side effects may include:  stuffy nose, headache, feeling tired, muscle aches, upset stomach    - Before receiving any vaccines, Call the [facility] at [phone]     - You get sick, are on antibiotics, have had a recent vaccine, have surgery or dental work and your doctor wants your visit rescheduled.    - You need to cancel and reschedule your visit for any reason. Call at least 2 days before your visit if you need to cancel.     - Your insurance changes before your next visit.    (We will need to get approval from your new insurance. This can take up to two weeks.)     The [facility] is opened Monday thru Friday 8am-8pm and Saturday 8am-4:30pm. We are closed on holidays.     Voice mail will take your call if the center is closed. If you leave a message please  allow 24 hours for a call back during weekdays. If you leave a message on a weekend/holiday, we will call you back the next business day.

## 2025-04-17 ENCOUNTER — SPECIALTY PHARMACY (OUTPATIENT)
Dept: PHARMACY | Facility: CLINIC | Age: 65
End: 2025-04-17

## 2025-04-18 ENCOUNTER — TELEPHONE (OUTPATIENT)
Dept: GASTROENTEROLOGY | Facility: CLINIC | Age: 65
End: 2025-04-18
Payer: COMMERCIAL

## 2025-04-18 DIAGNOSIS — K21.9 GASTROESOPHAGEAL REFLUX DISEASE WITHOUT ESOPHAGITIS: ICD-10-CM

## 2025-04-18 DIAGNOSIS — K50.10 CROHN'S DISEASE OF COLON WITHOUT COMPLICATION (MULTI): ICD-10-CM

## 2025-04-18 RX ORDER — OMEPRAZOLE 40 MG/1
40 CAPSULE, DELAYED RELEASE ORAL
Qty: 90 CAPSULE | Refills: 3 | Status: SHIPPED | OUTPATIENT
Start: 2025-04-18

## 2025-04-18 NOTE — TELEPHONE ENCOUNTER
----- Message from Meera VARGAS sent at 4/18/2025  9:57 AM EDT -----  Hi,The PA is approved: CaseId:[id number];Status:Approved;Review Type:Prior Auth;Coverage Start Date:03/18/2025;Coverage End Date:10/14/2025But, [facility] is not contracted with his plan to fill the medication. Please forward the Rx to:[facility] [phone] you,Beth  ----- Message -----  From: Khadijah Polanco RN  Sent: 4/17/2025   2:11 PM EDT  To: Meera Pérez there! This patient has completed his last Skyrizi IV infusion on 4/15. He will be due for his first Maintenance on 5/13 . Thank you!

## 2025-05-06 ENCOUNTER — OFFICE VISIT (OUTPATIENT)
Dept: URGENT CARE | Age: 65
End: 2025-05-06
Payer: COMMERCIAL

## 2025-05-06 VITALS
SYSTOLIC BLOOD PRESSURE: 136 MMHG | DIASTOLIC BLOOD PRESSURE: 85 MMHG | TEMPERATURE: 98.1 F | HEART RATE: 87 BPM | RESPIRATION RATE: 22 BRPM | OXYGEN SATURATION: 94 %

## 2025-05-06 DIAGNOSIS — J01.00 ACUTE NON-RECURRENT MAXILLARY SINUSITIS: Primary | ICD-10-CM

## 2025-05-06 RX ORDER — DOXYCYCLINE HYCLATE 100 MG
100 TABLET ORAL 2 TIMES DAILY
Qty: 20 TABLET | Refills: 0 | Status: SHIPPED | OUTPATIENT
Start: 2025-05-06 | End: 2025-05-16

## 2025-05-06 ASSESSMENT — ENCOUNTER SYMPTOMS
SINUS PAIN: 1
SINUS COMPLAINT: 1
SINUS PRESSURE: 1
COUGH: 1
FEVER: 0

## 2025-05-06 ASSESSMENT — PAIN SCALES - GENERAL: PAINLEVEL_OUTOF10: 7

## 2025-05-06 NOTE — PROGRESS NOTES
Subjective   Patient ID: Olayinka Kowalski is a 64 y.o. male. They present today with a chief complaint of Sinus Problem (  Per Pt hx feeling sinus pressure for five days and feeling sinus congestion for one week.  Pt stated took home covid test yesterday and it was negative.).    History of Present Illness  Patient is a 64 year old male presenting for evaluation of a sinus problem. Symptoms started about a week ago and worsened over the weekend. He reports sinus pain and pressure to right side especially behind the eye. Described as throbbing and pressure. Sinuses are feeling very blocked, having a hard time blowing anything out but when he does recently it has been green. Intermittent cough, green mucous with this as well. Taking ibuprofen and OTC sinus medication with minimal relief. Denies fever. Negative covid test at home yesterday. He wears cpap which he has found more uncomfortable since symptoms started.      History provided by:  Patient   used: No    Sinus Problem  Associated symptoms: congestion and cough    Associated symptoms: no fever        Past Medical History  Allergies as of 05/06/2025 - Reviewed 05/06/2025   Allergen Reaction Noted    Bee venom protein (honey bee) Anaphylaxis 02/23/2023    Morphine Anaphylaxis 02/15/2023    Other Anaphylaxis 02/15/2023    Penicillin Anaphylaxis 02/20/2023    Penicillins Anaphylaxis 02/15/2023    Cephalosporins Swelling and GI Upset 02/15/2023    Imuran [azathioprine] Nausea/vomiting 08/09/2024       Prescriptions Prior to Admission[1]     Medical History[2]    Surgical History[3]     reports that he quit smoking about 14 years ago. His smoking use included cigarettes. He has never used smokeless tobacco. He reports current alcohol use. Drug use questions deferred to the physician.    Review of Systems  Review of Systems   Constitutional:  Negative for fever.   HENT:  Positive for congestion, sinus pressure and sinus pain.    Respiratory:  Positive  for cough.                                   Objective    Vitals:    05/06/25 1010   BP: 136/85   BP Location: Left arm   Patient Position: Sitting   BP Cuff Size: Large adult   Pulse: 87   Resp: 22   Temp: 36.7 °C (98.1 °F)   TempSrc: Oral   SpO2: 94%     No LMP for male patient.    Physical Exam  Constitutional:       General: He is not in acute distress.     Appearance: Normal appearance. He is normal weight. He is not ill-appearing or toxic-appearing.   HENT:      Head: Normocephalic. No right periorbital erythema or left periorbital erythema.      Jaw: There is normal jaw occlusion. No trismus.      Right Ear: Tympanic membrane, ear canal and external ear normal. There is no impacted cerumen.      Left Ear: Tympanic membrane, ear canal and external ear normal. There is no impacted cerumen.      Nose: Mucosal edema and congestion present.      Mouth/Throat:      Mouth: Mucous membranes are moist.      Pharynx: No oropharyngeal exudate or posterior oropharyngeal erythema.   Eyes:      General: No scleral icterus.        Right eye: No discharge.         Left eye: No discharge.      Conjunctiva/sclera: Conjunctivae normal.   Neck:      Trachea: Phonation normal.   Cardiovascular:      Rate and Rhythm: Normal rate and regular rhythm.      Heart sounds: Normal heart sounds. No murmur heard.     No friction rub. No gallop.   Pulmonary:      Effort: Pulmonary effort is normal. No respiratory distress.      Breath sounds: Normal breath sounds. No stridor. No wheezing, rhonchi or rales.   Neurological:      General: No focal deficit present.      Mental Status: He is alert.      Gait: Gait normal.   Psychiatric:         Mood and Affect: Mood normal.         Behavior: Behavior normal.         Thought Content: Thought content normal.         Procedures    Point of Care Test & Imaging Results from this visit  No results found for this visit on 05/06/25.   Imaging  No results found.    Cardiology, Vascular, and Other  Imaging  No other imaging results found for the past 2 days      Diagnostic study results (if any) were reviewed by Keiko Dunaway PA-C.    Assessment/Plan   Allergies, medications, history, and pertinent labs/EKGs/Imaging reviewed by Keiko Dunaway PA-C.     Medical Decision Making  Patient is a 64 year old male presenting for evaluation of sinus concern. Hemodynamically stable. Nontoxic appearing, no meningismus. Posterior oropharynx clear, no exudates or vesicular lesions. Uvula is midline and there is no asymmetrical swelling of tonsils, drooling, trismus or muffled voice to suggest RPA or PTA. TM without erythema or bulging to suggest otitis media. Lungs clear to auscultation, low suspicion for pneumonia.  History and exam consistent with sinusitis. He is immunosuppressed on medication for crohn's. Will treat with doxycycline given allergies to penicillin and cephalosporins. Discussed supportive care, OTC medications as needed, tylenol/ibuprofen for pain or fever, rest, fluids.     At time of discharge, patient was clinically well-appearing and appropriate for outpatient management. The patient/parent/guardian was educated regarding diagnosis, supportive care, OTC and Rx medications. The patient/parent/guardian was given the opportunity to ask questions prior to discharge. They verbalized understanding of discussion of treatment plan, expected course of illness and/or injury, indications on when to return to , when to seek further evaluation in ED/call 911, and the need to follow up with PCP and/or specialist as referred. Patient/parent/guardian was provided with work/school documentation if requested. Patient stable upon discharge.     Orders and Diagnoses  Diagnoses and all orders for this visit:  Acute non-recurrent maxillary sinusitis  -     doxycycline (Vibra-Tabs) 100 mg tablet; Take 1 tablet (100 mg) by mouth 2 times a day for 10 days. Take with a full glass of water and do not lie down for at  least 30 minutes after.      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    Meniere's disease of left ear    Personal history of other benign neoplasm 01/04/2019    History of other benign neoplasm    Personal history of other diseases of the digestive system     History of Ennis's esophagus    Personal history of other diseases of the respiratory system 12/06/2017    History of acute sinusitis    Personal history of other endocrine, nutritional and metabolic disease     History of hyperlipidemia    Sleep apnea    [3]   Past Surgical History:  Procedure Laterality Date    LUMBAR LAMINECTOMY  09/17/2015    Laminectomy Lumbar    OTHER SURGICAL HISTORY  08/30/2022    Appendectomy    OTHER SURGICAL HISTORY  08/30/2022    Knee arthroscopy

## 2025-05-06 NOTE — PATIENT INSTRUCTIONS
Take doxycycline with food, not dairy   Get plenty of rest and fluids to help thin mucous   Use warm compresses or steam to help relieve pressure and thin mucous   Tylenol or ibuprofen every 6 hours for pain or pressure   ER if worsening symptoms including shortness of breath, uncontrolled fevers, chest pain, severe headache   Follow up with your primary care doctor to ensure resolution in the next 5 days

## 2025-05-13 ENCOUNTER — APPOINTMENT (OUTPATIENT)
Dept: INFUSION THERAPY | Facility: CLINIC | Age: 65
End: 2025-05-13
Payer: COMMERCIAL

## 2025-05-19 ENCOUNTER — APPOINTMENT (OUTPATIENT)
Dept: GASTROENTEROLOGY | Facility: CLINIC | Age: 65
End: 2025-05-19
Payer: COMMERCIAL

## 2025-05-19 VITALS
HEIGHT: 73 IN | DIASTOLIC BLOOD PRESSURE: 90 MMHG | RESPIRATION RATE: 16 BRPM | HEART RATE: 72 BPM | BODY MASS INDEX: 26.24 KG/M2 | OXYGEN SATURATION: 97 % | WEIGHT: 198 LBS | SYSTOLIC BLOOD PRESSURE: 143 MMHG

## 2025-05-19 DIAGNOSIS — K50.10 CROHN'S DISEASE OF COLON WITHOUT COMPLICATION (MULTI): Primary | ICD-10-CM

## 2025-05-19 NOTE — PROGRESS NOTES
"CC: Follow up of IBD.    History of Present Illness:   Olayinka Kowalski is a 63yo male with a PMH of HTN, HLD, CKD, MATTHEW, colon polyps, Ennis's esophagus, GERD, and Ménière's disease who presents to clinic for follow-up. Patient started Skyrizi in 2/2025. He just had his first on body injection. He completed steroid taper in ~ early 4/2025. No further rectal bleeding or diarrhea since later March/early April 2025. All his bowel movements have been normal. Has ~2 BMs daily. Taking fiber daily. No joint pains, fatigue. No other GI complaints. Retiring 1/2/205.       GI note 1/2025: \"Olayinka Kowalski is a 63yo male with a PMH of HTN, HLD, CKD, MATTHEW, colon polyps, Ennis's esophagus, GERD, and Ménière's disease and in whom we are following for Crohn's colitis.  Patient had been doing well on infliximab, but started to develop subtle symptoms.  We checked TDM which showed low drug level low antibodies.  We increased the infliximab to 10 mg/kg and started Imuran.  However, patient did not tolerate the Imuran.  Patient with continued subtle symptoms and we repeated TDM.  It now shows low drug level, high antibodies.  Colonoscopy with inflammation of the sigmoid colon.  Symptoms now getting worse with some rectal bleeding.  We have decided to switch infliximab to Skyrizi.\"    GI visit 6/2024: \"Patient is doing fairly well.  He has noticed that his stools have been changing over the last couple weeks.  +1 episode of bleeding.  This seems to correspond with his low Remicade levels and elevated antibodies.  No other significant concerns at this time.\"    GI visit 11/2023: \"Patient states that he was doing fine until July when he started to develop bloody diarrhea.  It has been progressively getting worse.  Sometimes, his stools will be small and formed, making him think that maybe there was a constipation component.  His PCP did blood work and stool tests.  Blood work showed a mild anemia.  Stool infectious studies were WNL.  Fecal " "calprotectin was over 3000.  Patient states that he is now using the restroom multiple times a day.  He is fatigued.  He just does not feel well. Weight is stable.\"    GI visit 5/2023: \"Follow-up of abnormal stools, blood in stools/on toilet paper. Patient states that for the last 7 to 10 days, he has had abnormal stools and blood in the toilet bowl/on the toilet paper. It has progressively gotten worse. He describes the stools as pellet-like and thin. No diarrhea. + Incomplete emptying. He was recently down in [place]. Thus, his daily regimen was different. Different foods and a little bit more alcohol. He denies any abdominal pain. He has had 2 colonoscopies in the last 4 years. Colonoscopy 12/2022: Diverticulosis, SCAD, hemorrhoids.\"     GI visit 11/2022: \"He reports to be feeling much better today after ER visit. No abd pain, nausea/vomiting, diarrhea or constipation. He has normal BM twice daily but did notice blood in his stool for about 1 week after treatment of diverticulitis with abx. However, no further blood in stool for the last 3 weeks. He has also changed his diet, eating lots of grains, less red meat and drinks lots of water. His last colonoscopy 3 years ago that revealed 2mm cecal polyp which was removed.\"     Medical/surgery history: Please see above.  Labs: Reviewed.      Review of Systems  ROS Negative unless otherwise stated above.    Past Medical/Surgical History  Past Medical History:   Diagnosis Date    Benign positional vertigo 02/15/2023    Diverticulitis, colon 08/25/2023    Meniere's disease, left ear 10/05/2022    Meniere's disease of left ear    Personal history of other benign neoplasm 01/04/2019    History of other benign neoplasm    Personal history of other diseases of the digestive system     History of Ennis's esophagus    Personal history of other diseases of the respiratory system 12/06/2017    History of acute sinusitis    Personal history of other endocrine, nutritional and " metabolic disease     History of hyperlipidemia    Sleep apnea       Past Surgical History:   Procedure Laterality Date    LUMBAR LAMINECTOMY  09/17/2015    Laminectomy Lumbar    OTHER SURGICAL HISTORY  08/30/2022    Appendectomy    OTHER SURGICAL HISTORY  08/30/2022    Knee arthroscopy        Social History   reports that he quit smoking about 14 years ago. His smoking use included cigarettes. He has never used smokeless tobacco. He reports current alcohol use. Drug use questions deferred to the physician.     Family History  family history includes CABG in his father; Heart failure in his father; Hypertension in his father; aortic valve replacement in his father; cardiac pacemaker in his father.     Allergies  Allergies   Allergen Reactions    Bee Venom Protein (Honey Bee) Anaphylaxis    Morphine Anaphylaxis    Other Anaphylaxis    Penicillin Anaphylaxis    Penicillins Anaphylaxis    Cephalosporins Swelling and GI Upset    Imuran [Azathioprine] Nausea/vomiting       Medications  Current Outpatient Medications   Medication Instructions    allopurinol (ZYLOPRIM) 100 mg, oral, Daily    atorvastatin (Lipitor) 80 mg tablet TAKE 1 TABLET DAILY (INCREASED)    EPINEPHrine (EPIPEN) 0.3 mg, intramuscular, Once as needed, As Directed    ergocalciferol (Vitamin D-2) 1.25 MG (45470 UT) capsule 1 capsule, Every 14 days    magnesium 250 mg, oral, Nightly    omeprazole (PRILOSEC) 40 mg, oral, Daily before breakfast    ramipril (ALTACE) 2.5 mg, oral, Daily    risankizumab-rzaa (SKYRIZI) 600 mg, intravenous, Every 28 days, at week 0, 4 and 8    risankizumab-rzaa (SKYRIZI) 360 mg, subcutaneous, Every 8 weeks    triamterene-hydrochlorothiazid (Maxzide-25) 37.5-25 mg tablet TAKE 1 TABLET DAILY (FOLLOW UP 10/13/21)    ubidecarenone (COQ-10 ORAL) 1 capsule, Daily        Objective   Visit Vitals  /90   Pulse 72   Resp 16     General: A&Ox3, NAD.  HEENT: AT/NC.   Extrem: No edema.   Skin: No Jaundice.   Neuro: No focal deficits.    Psych: Normal mood and affect.     Assessment/Plan   Olayinka Kowalski is a 63yo male with a PMH of HTN, HLD, CKD, MATTHEW, colon polyps, Ennis's esophagus, GERD, and Ménière's disease who presents to clinic for follow-up of Crohn's colitis.  Failed infliximab (antibodies), now on Skyrizi. Doing well.     -Continue Skyrizi.   -Obtain CBC, BMP, LFTs, CRP, ESR.  -Obtain fecal calprotectin.   -Mediterranean diet.  -Remain UTD on vaccination.  -Yearly skin, eye exams.     Follow up in 6 months.     David Tapia, DO

## 2025-05-20 LAB
ALBUMIN SERPL-MCNC: 4.3 G/DL (ref 3.6–5.1)
ALBUMIN/GLOB SERPL: 2 (CALC) (ref 1–2.5)
ALP SERPL-CCNC: 63 U/L (ref 35–144)
ALT SERPL-CCNC: 15 U/L (ref 9–46)
ANION GAP SERPL CALCULATED.4IONS-SCNC: 12 MMOL/L (CALC) (ref 7–17)
AST SERPL-CCNC: 17 U/L (ref 10–35)
BASOPHILS # BLD AUTO: 103 CELLS/UL (ref 0–200)
BASOPHILS NFR BLD AUTO: 1 %
BILIRUB DIRECT SERPL-MCNC: 0.1 MG/DL
BILIRUB INDIRECT SERPL-MCNC: 0.3 MG/DL (CALC) (ref 0.2–1.2)
BILIRUB SERPL-MCNC: 0.4 MG/DL (ref 0.2–1.2)
BUN SERPL-MCNC: 19 MG/DL (ref 7–25)
BUN/CREAT SERPL: 12 (CALC) (ref 6–22)
CALCIUM SERPL-MCNC: 9.6 MG/DL (ref 8.6–10.3)
CHLORIDE SERPL-SCNC: 104 MMOL/L (ref 98–110)
CO2 SERPL-SCNC: 25 MMOL/L (ref 20–32)
CREAT SERPL-MCNC: 1.56 MG/DL (ref 0.7–1.35)
CRP SERPL-MCNC: 4.1 MG/L
EGFRCR SERPLBLD CKD-EPI 2021: 49 ML/MIN/1.73M2
EOSINOPHIL # BLD AUTO: 783 CELLS/UL (ref 15–500)
EOSINOPHIL NFR BLD AUTO: 7.6 %
ERYTHROCYTE [DISTWIDTH] IN BLOOD BY AUTOMATED COUNT: 12.9 % (ref 11–15)
ERYTHROCYTE [SEDIMENTATION RATE] IN BLOOD BY WESTERGREN METHOD: 17 MM/H
GLOBULIN SER CALC-MCNC: 2.2 G/DL (CALC) (ref 1.9–3.7)
GLUCOSE SERPL-MCNC: 107 MG/DL (ref 65–139)
HCT VFR BLD AUTO: 41.8 % (ref 38.5–50)
HGB BLD-MCNC: 14.1 G/DL (ref 13.2–17.1)
LYMPHOCYTES # BLD AUTO: 2307 CELLS/UL (ref 850–3900)
LYMPHOCYTES NFR BLD AUTO: 22.4 %
MCH RBC QN AUTO: 32.3 PG (ref 27–33)
MCHC RBC AUTO-ENTMCNC: 33.7 G/DL (ref 32–36)
MCV RBC AUTO: 95.7 FL (ref 80–100)
MONOCYTES # BLD AUTO: 803 CELLS/UL (ref 200–950)
MONOCYTES NFR BLD AUTO: 7.8 %
NEUTROPHILS # BLD AUTO: 6304 CELLS/UL (ref 1500–7800)
NEUTROPHILS NFR BLD AUTO: 61.2 %
PLATELET # BLD AUTO: 373 THOUSAND/UL (ref 140–400)
PMV BLD REES-ECKER: 9.9 FL (ref 7.5–12.5)
POTASSIUM SERPL-SCNC: 4.1 MMOL/L (ref 3.5–5.3)
PROT SERPL-MCNC: 6.5 G/DL (ref 6.1–8.1)
RBC # BLD AUTO: 4.37 MILLION/UL (ref 4.2–5.8)
SODIUM SERPL-SCNC: 141 MMOL/L (ref 135–146)
WBC # BLD AUTO: 10.3 THOUSAND/UL (ref 3.8–10.8)

## 2025-05-25 LAB — CALPROTECTIN STL-MCNT: 20 MCG/G

## 2025-05-27 ENCOUNTER — APPOINTMENT (OUTPATIENT)
Dept: INFUSION THERAPY | Facility: CLINIC | Age: 65
End: 2025-05-27
Payer: COMMERCIAL

## 2025-06-02 ENCOUNTER — OFFICE VISIT (OUTPATIENT)
Dept: OTOLARYNGOLOGY | Facility: CLINIC | Age: 65
End: 2025-06-02
Payer: COMMERCIAL

## 2025-06-02 ENCOUNTER — CLINICAL SUPPORT (OUTPATIENT)
Dept: AUDIOLOGY | Facility: CLINIC | Age: 65
End: 2025-06-02
Payer: COMMERCIAL

## 2025-06-02 DIAGNOSIS — H81.02 MENIERE'S DISEASE OF LEFT EAR: ICD-10-CM

## 2025-06-02 DIAGNOSIS — H90.3 ASYMMETRIC SNHL (SENSORINEURAL HEARING LOSS): Primary | ICD-10-CM

## 2025-06-02 DIAGNOSIS — H81.09 MENIERE'S DISEASE, UNSPECIFIED LATERALITY: Primary | ICD-10-CM

## 2025-06-02 PROCEDURE — 92567 TYMPANOMETRY: CPT | Performed by: AUDIOLOGIST

## 2025-06-02 PROCEDURE — 99213 OFFICE O/P EST LOW 20 MIN: CPT | Performed by: OTOLARYNGOLOGY

## 2025-06-02 PROCEDURE — 92553 AUDIOMETRY AIR & BONE: CPT | Performed by: AUDIOLOGIST

## 2025-06-02 RX ORDER — MECLIZINE HYDROCHLORIDE 25 MG/1
25 TABLET ORAL 3 TIMES DAILY PRN
Qty: 30 TABLET | Refills: 0 | Status: SHIPPED | OUTPATIENT
Start: 2025-06-02 | End: 2025-06-03 | Stop reason: ALTCHOICE

## 2025-06-02 NOTE — PROGRESS NOTES
Patient returns.  Seeing him back today as he developed significant exacerbation of his dizziness and imbalance issues.  I am not sure if this is because he started Skyrizi as his new medication and discontinued his other biologic.  He has had some evident positional issues as when he got out of the car on Thursday with that he was vacuuming he got dizzy.  He also shows a touch of maybe even some contribution from postural hypotension.  He has not had any dramatic alteration or change in hearing.  There have been no significant changes in past medical or past surgical histories except as mentioned.    Exam:  No acute distress.  The external ear structures appear normal. The ear canals patent and the tympanic membranes are intact without evidence of air-fluid levels, retraction, or congenital defects.  Anterior rhinoscopy notes essentially a midline nasal septum. Examination is noted for normal healthy mucosal membranes without any evidence of lesions, polyps, or exudate. The tongue is normally mobile. There are no lesions on the gingiva, buccal, or oral mucosa. There are no oral cavity masses.  The neck is negative for mass lymphadenopathy. The trachea and parotid are clear. The thyroid bed is grossly unremarkable. The salivary gland structures are grossly unremarkable.    Comparative audiogram shows no significant change compared October.    Assessment and plan:  Exacerbation of dizziness and vertigo etc.  May be a superimposed portion of vestibular crystal such as BPPV or postural hypotension.  He may also have a reaction to the Skyrizi.  We are going to have him trial vestibular exercises and start him on meclizine and see how he fares and he will update me on his status over the next 3 to 4 weeks.  All questions were answered in this regard accordingly.

## 2025-06-02 NOTE — PROGRESS NOTES
"Mr. Kowalski, age 64, returned today for a hearing evaluation during his ENT visit with Dr. Martinez.  He has a history of Meniere's disease involving his left ear and arrives today with concern for dizziness which has been ongoing for the past Thursday.  He hasn't had an episode in many months and previously they have not lasted this long which has him perplexed.  He denies significant change in hearing but reports his ears do feel \"off.\"    Results:  Otoscopy revealed clear ear canals and tympanic membranes were visualized bilaterally.  Tympanometry revealed Type A sub d tympanograms, indicating normal ear canal volume and peak pressure with increased compliance/mobility bilaterally.  Audiometric thresholds revealed a mild rising to slight sensorineural hearing loss affecting 4295-4574 Hz in his right ear and a mild to moderate sensorineural hearing loss in his left ear.   Hearing levels have remained stable as compared to his last evaluation October 2024.    Recommendations:  Follow-up with PCP, Dr. Steve, as medically directed.  Follow-up with ENT, Dr. Martinez, as medically directed.  Retest hearing as directed in conjunction with otologic care or annually to monitor.  "

## 2025-06-03 DIAGNOSIS — H81.09 MENIERE'S DISEASE, UNSPECIFIED LATERALITY: Primary | ICD-10-CM

## 2025-06-03 DIAGNOSIS — H81.02 MENIERE'S DISEASE OF LEFT EAR: ICD-10-CM

## 2025-06-03 RX ORDER — MECLIZINE HYDROCHLORIDE 25 MG/1
25 TABLET ORAL 3 TIMES DAILY PRN
Qty: 30 TABLET | Refills: 0 | Status: SHIPPED | OUTPATIENT
Start: 2025-06-03 | End: 2025-06-13

## 2025-06-05 ENCOUNTER — APPOINTMENT (OUTPATIENT)
Facility: CLINIC | Age: 65
End: 2025-06-05
Payer: COMMERCIAL

## 2025-06-05 VITALS
SYSTOLIC BLOOD PRESSURE: 115 MMHG | DIASTOLIC BLOOD PRESSURE: 81 MMHG | RESPIRATION RATE: 18 BRPM | BODY MASS INDEX: 26.77 KG/M2 | WEIGHT: 202 LBS | HEIGHT: 73 IN | HEART RATE: 85 BPM | OXYGEN SATURATION: 95 %

## 2025-06-05 DIAGNOSIS — G47.33 OSA (OBSTRUCTIVE SLEEP APNEA): Primary | ICD-10-CM

## 2025-06-05 DIAGNOSIS — E66.3 OVERWEIGHT (BMI 25.0-29.9): ICD-10-CM

## 2025-06-05 PROCEDURE — 3079F DIAST BP 80-89 MM HG: CPT | Performed by: GENERAL PRACTICE

## 2025-06-05 PROCEDURE — 1159F MED LIST DOCD IN RCRD: CPT | Performed by: GENERAL PRACTICE

## 2025-06-05 PROCEDURE — 1036F TOBACCO NON-USER: CPT | Performed by: GENERAL PRACTICE

## 2025-06-05 PROCEDURE — 3008F BODY MASS INDEX DOCD: CPT | Performed by: GENERAL PRACTICE

## 2025-06-05 PROCEDURE — 99214 OFFICE O/P EST MOD 30 MIN: CPT | Performed by: GENERAL PRACTICE

## 2025-06-05 PROCEDURE — 3074F SYST BP LT 130 MM HG: CPT | Performed by: GENERAL PRACTICE

## 2025-06-05 ASSESSMENT — SLEEP AND FATIGUE QUESTIONNAIRES
HOW LIKELY ARE YOU TO NOD OFF OR FALL ASLEEP WHILE SITTING QUIETLY AFTER LUNCH WITHOUT ALCOHOL: WOULD NEVER DOZE
ESS-CHAD TOTAL SCORE: 2
HOW LIKELY ARE YOU TO NOD OFF OR FALL ASLEEP WHILE LYING DOWN TO REST IN THE AFTERNOON WHEN CIRCUMSTANCES PERMIT: WOULD NEVER DOZE
HOW LIKELY ARE YOU TO NOD OFF OR FALL ASLEEP WHILE WATCHING TV: SLIGHT CHANCE OF DOZING
HOW LIKELY ARE YOU TO NOD OFF OR FALL ASLEEP WHILE SITTING AND TALKING TO SOMEONE: WOULD NEVER DOZE
HOW LIKELY ARE YOU TO NOD OFF OR FALL ASLEEP WHILE SITTING AND READING: SLIGHT CHANCE OF DOZING
HOW LIKELY ARE YOU TO NOD OFF OR FALL ASLEEP WHEN YOU ARE A PASSENGER IN A CAR FOR AN HOUR WITHOUT A BREAK: WOULD NEVER DOZE
HOW LIKELY ARE YOU TO NOD OFF OR FALL ASLEEP IN A CAR, WHILE STOPPED FOR A FEW MINUTES IN TRAFFIC: WOULD NEVER DOZE
SITING INACTIVE IN A PUBLIC PLACE LIKE A CLASS ROOM OR A MOVIE THEATER: WOULD NEVER DOZE

## 2025-06-05 ASSESSMENT — PATIENT HEALTH QUESTIONNAIRE - PHQ9
2. FEELING DOWN, DEPRESSED OR HOPELESS: NOT AT ALL
1. LITTLE INTEREST OR PLEASURE IN DOING THINGS: NOT AT ALL
SUM OF ALL RESPONSES TO PHQ9 QUESTIONS 1 AND 2: 0

## 2025-06-05 ASSESSMENT — COLUMBIA-SUICIDE SEVERITY RATING SCALE - C-SSRS
1. IN THE PAST MONTH, HAVE YOU WISHED YOU WERE DEAD OR WISHED YOU COULD GO TO SLEEP AND NOT WAKE UP?: NO
6. HAVE YOU EVER DONE ANYTHING, STARTED TO DO ANYTHING, OR PREPARED TO DO ANYTHING TO END YOUR LIFE?: NO
2. HAVE YOU ACTUALLY HAD ANY THOUGHTS OF KILLING YOURSELF?: NO

## 2025-06-05 NOTE — PROGRESS NOTES
Patient: Olayinka Kowalski    [id number]  : 1960 -- AGE 64 y.o.    Provider: Adrianna Chandler DO     Location [facility]   Service Date: 2025              [facility]  Follow up Visit Note        HISTORY OF PRESENT ILLNESS   HISTORY OF PRESENT ILLNESS   Olayinka Kowalski is a 64 y.o. male who presents to a [facility] for a sleep medicine evaluation with concerns of Sleep Apnea.     The patient  has a past medical history of Benign positional vertigo (02/15/2023), Diverticulitis, colon (2023), Meniere's disease, left ear (10/05/2022), Personal history of other benign neoplasm (2019), Personal history of other diseases of the digestive system, Personal history of other diseases of the respiratory system (2017), Personal history of other endocrine, nutritional and metabolic disease, and Sleep apnea..    PAST SLEEP HISTORY    M with pmhx including Aryan's esophagus, CKD, Menieres syndrome.      Patient was tested for sleep apnea due to snoring; he would like to discuss his treatment options. He started snoring in the last few years, he has not tried anything for it.      Patient's partner is present during encounter and is providing history along with patient.      23  Patient started using pap therapy. Patient feels that he is sleeping more now, wakes up more refreshed. He has more energy at the gym.     CURRENT HISTORY    On today's visit, 2025, the patient reports that he is doing well. He is comfortable with his mask and settings.     PAP Related Problems: sometimes he has dry mouth, but~no leak perceived,~no skin irritation from mask~and~no snoring with PAP.     Perceived Benefits of PAP Therapy: refreshing sleep,~decreased dry mouth or sore throat,~decreased daytime sleepiness~and~decreased snoring/ choking/ gasping.     Sleep schedule  on weekdays / work days:  Usual Bedtime: 8:30pm  Sleep latency: few min    Wake time : 4:30am  Total sleep time average/day: 6-7 hours/day  Awakenings: 1x per night, nocturia, short.   Naps: rarely on the weekends while watching TV.       Sleep schedule  on weekends/non work days :  Usual Bedtime:   10pm  Wake time : 7am    Sleep aids: no  Stimulants: no    Occupation:  runs [employer]?.     Preferred sleeping position: SLEEP POSITION: supine    Sleep-related ROS:    Snoring:  n  Witnessed apneas:   n     Gasping/ choking: n     Am Dry mouth:   sometimes           Nasal congestion:   y      am headaches: n    Sleep is described as refreshing     Daytime sleepiness: rarely   Fatigue or decreased energy: rarely        Drowsy driving: n  Hx of car accident: n  Near-miss Car accident: n      RLS screen:  RLSSCREEN: - Sensations: Patient does not have unusual sensations in their extremities that cause an urge to move them     Sleep-related behaviors: DENIES    ESS: 2       REVIEW OF SYSTEMS     REVIEW OF SYSTEMS  Review of Systems   All other systems reviewed and are negative.      ALLERGIES AND MEDICATIONS     ALLERGIES  Allergies[1]    MEDICATIONS  Current Medications[2]      PAST HISTORY     PAST MEDICAL HISTORY  He  has a past medical history of Benign positional vertigo (02/15/2023), Diverticulitis, colon (08/25/2023), Meniere's disease, left ear (10/05/2022), Personal history of other benign neoplasm (01/04/2019), Personal history of other diseases of the digestive system, Personal history of other diseases of the respiratory system (12/06/2017), Personal history of other endocrine, nutritional and metabolic disease, and Sleep apnea.      PAST SURGICAL HISTORY:  Surgical History[3]    FAMILY HISTORY  Family History[4]  DOES/DOES NOT EC: does have a family history of sleep disorder.  Mother with hx of sleep apnea.     SOCIAL HISTORY  He  reports that he quit smoking about 14 years ago. His smoking use included cigarettes. He has never used smokeless tobacco. He reports current  "alcohol use. Drug use questions deferred to the physician.     Caffeine consumption: 2 cups coffee in am  Alcohol consumption: on weekends sometimes   Smoking: n  Marijuana: n  Other drugs: n      PHYSICAL EXAM     VITAL SIGNS: /81   Pulse 85   Resp 18   Ht 1.854 m (6' 1\")   Wt 91.6 kg (202 lb)   SpO2 95%   BMI 26.65 kg/m²      PREVIOUS WEIGHTS:  Wt Readings from Last 3 Encounters:   06/05/25 91.6 kg (202 lb)   05/19/25 89.8 kg (198 lb)   03/18/25 91.6 kg (201 lb 13.3 oz)       Physical Exam  Constitutional: Alert and oriented, cooperative, no obvious distress.   HENT: normocephalic.   Neurologic: AOx3.   psychiatric: appropriate mood and affect.  Integumentary: no significant rashes observed over pap mask area.     RESULTS/DATA     Iron (ug/dL)   Date Value   11/02/2023 57     % Saturation (%)   Date Value   11/02/2023 17 (L)     TIBC (ug/dL)   Date Value   11/02/2023 326     Ferritin (ng/mL)   Date Value   11/02/2023 139           ASSESSMENT/PLAN     Mr. Kowalski is a 64 y.o. male and  has a past medical history of Benign positional vertigo (02/15/2023), Diverticulitis, colon (08/25/2023), Meniere's disease, left ear (10/05/2022), Personal history of other benign neoplasm (01/04/2019), Personal history of other diseases of the digestive system, Personal history of other diseases of the respiratory system (12/06/2017), Personal history of other endocrine, nutritional and metabolic disease, and Sleep apnea. He is following up on sleep apnea.     Problem List Items Addressed This Visit    None      Problem List and Orders    M with pmhx including Aryan's esophagus, CKD, Menieres syndrome, former smoker, occasional cigar user.      1- MATTHEW  HST 9/26/22 --> severe MATTHEW, ARELIS 3% 50.7, ARELIS 4% 42.8, BLUE 7.4; SpO2 german 76%. spent 182 min <=88%.      6/21/23 nocturnal pox done on room air on Autopap 5-15cwp, SpO2<=88% 1min 18sec.     Reviewed and discussed the above sleep study results and management options in " details. All questions answered, patient verbalizes understanding.      -cont. Autopap 5-15cwp, rAHI 3.1, median pressure: 6.4, max avg  11.2, few days with a large leak, good compliance.     -ordered supplies and mask refitting; [facility]  - Provided sample mask in clinic today, pillows    -do not drive or operate heavy machinery if drowsy.  -avoid sleeping on your back.   -avoid sedating substances/ medication, alcohol, illicit drugs and tobacco.      2- Overweight  counseled on eating a healthy diet and exercising as tolerated.    f/u 12 months or sooner as needed              [1]   Allergies  Allergen Reactions    Bee Venom Protein (Honey Bee) Anaphylaxis    Morphine Anaphylaxis    Other Anaphylaxis    Penicillin Anaphylaxis    Penicillins Anaphylaxis    Cephalosporins Swelling and GI Upset    Imuran [Azathioprine] Nausea/vomiting   [2]   Current Outpatient Medications   Medication Sig Dispense Refill    allopurinol (Zyloprim) 100 mg tablet Take 1 tablet (100 mg) by mouth once daily. 90 tablet 3    atorvastatin (Lipitor) 80 mg tablet TAKE 1 TABLET DAILY (INCREASED) 90 tablet 3    EPINEPHrine 0.3 mg/0.3 mL injection syringe Inject 0.3 mL (0.3 mg) into the muscle 1 time if needed for anaphylaxis for up to 1 dose. As Directed 1 each 1    ergocalciferol (Vitamin D-2) 1.25 MG (31636 UT) capsule Take 1 capsule (1.25 mg) by mouth every 14 (fourteen) days.      magnesium 250 mg tablet Take 1 tablet (250 mg) by mouth once daily at bedtime. 90 tablet 3    meclizine (Antivert) 25 mg tablet Take 1 tablet (25 mg) by mouth 3 times a day as needed for dizziness for up to 10 days. 30 tablet 0    omeprazole (PriLOSEC) 40 mg DR capsule TAKE 1 CAPSULE EVERY MORNING 90 capsule 3    risankizumab-rzaa (Skyrizi) 360 mg/2.4 mL (150 mg/mL) wearable injector injection Inject 2.4 mL (360 mg) under the skin every 8 (eight) weeks. 2.4 mL 3    risankizumab-rzaa (Skyrizi) 60 mg/mL solution injection Infuse 10 mL (600 mg total) into a venous  catheter every 28 (twenty-eight) days.  at week 0, 4 and 8 10 mL 2    triamterene-hydrochlorothiazid (Maxzide-25) 37.5-25 mg tablet TAKE 1 TABLET DAILY (FOLLOW UP 10/13/21) 90 tablet 3    ubidecarenone (COQ-10 ORAL) Take 1 capsule by mouth once daily.      ramipril (Altace) 1.25 mg capsule Take 2 capsules (2.5 mg) by mouth once daily. 180 capsule 3     No current facility-administered medications for this visit.   [3]   Past Surgical History:  Procedure Laterality Date    LUMBAR LAMINECTOMY  09/17/2015    Laminectomy Lumbar    OTHER SURGICAL HISTORY  08/30/2022    Appendectomy    OTHER SURGICAL HISTORY  08/30/2022    Knee arthroscopy   [4]   Family History  Problem Relation Name Age of Onset    Other (aortic valve replacement) Father          valvular heart disease, replacement age 71    Other (CABG) Father      Other (cardiac pacemaker) Father      Heart failure Father      Hypertension Father

## 2025-06-17 ENCOUNTER — TELEPHONE (OUTPATIENT)
Dept: NEPHROLOGY | Facility: CLINIC | Age: 65
End: 2025-06-17

## 2025-06-17 ENCOUNTER — APPOINTMENT (OUTPATIENT)
Dept: NEPHROLOGY | Facility: CLINIC | Age: 65
End: 2025-06-17
Payer: COMMERCIAL

## 2025-06-17 VITALS
BODY MASS INDEX: 26.72 KG/M2 | WEIGHT: 201.6 LBS | HEIGHT: 73 IN | HEART RATE: 87 BPM | DIASTOLIC BLOOD PRESSURE: 81 MMHG | SYSTOLIC BLOOD PRESSURE: 126 MMHG

## 2025-06-17 DIAGNOSIS — N18.31 STAGE 3A CHRONIC KIDNEY DISEASE (MULTI): ICD-10-CM

## 2025-06-17 DIAGNOSIS — I10 ESSENTIAL HYPERTENSION: Primary | ICD-10-CM

## 2025-06-17 LAB
25(OH)D3+25(OH)D2 SERPL-MCNC: 68 NG/ML (ref 30–100)
ANION GAP SERPL CALCULATED.4IONS-SCNC: 11 MMOL/L (CALC) (ref 7–17)
BUN SERPL-MCNC: 30 MG/DL (ref 7–25)
BUN/CREAT SERPL: 19 (CALC) (ref 6–22)
CALCIUM SERPL-MCNC: 9.3 MG/DL (ref 8.6–10.3)
CHLORIDE SERPL-SCNC: 103 MMOL/L (ref 98–110)
CO2 SERPL-SCNC: 24 MMOL/L (ref 20–32)
CREAT SERPL-MCNC: 1.6 MG/DL (ref 0.7–1.35)
EGFRCR SERPLBLD CKD-EPI 2021: 48 ML/MIN/1.73M2
GLUCOSE SERPL-MCNC: 112 MG/DL (ref 65–139)
POTASSIUM SERPL-SCNC: 4.2 MMOL/L (ref 3.5–5.3)
PTH-INTACT SERPL-MCNC: 34 PG/ML (ref 16–77)
SODIUM SERPL-SCNC: 138 MMOL/L (ref 135–146)

## 2025-06-17 PROCEDURE — 3008F BODY MASS INDEX DOCD: CPT | Performed by: INTERNAL MEDICINE

## 2025-06-17 PROCEDURE — 1159F MED LIST DOCD IN RCRD: CPT | Performed by: INTERNAL MEDICINE

## 2025-06-17 PROCEDURE — 3079F DIAST BP 80-89 MM HG: CPT | Performed by: INTERNAL MEDICINE

## 2025-06-17 PROCEDURE — 3074F SYST BP LT 130 MM HG: CPT | Performed by: INTERNAL MEDICINE

## 2025-06-17 PROCEDURE — 99213 OFFICE O/P EST LOW 20 MIN: CPT | Performed by: INTERNAL MEDICINE

## 2025-06-17 PROCEDURE — 1036F TOBACCO NON-USER: CPT | Performed by: INTERNAL MEDICINE

## 2025-06-17 NOTE — TELEPHONE ENCOUNTER
----- Message from Jorge Lynn sent at 6/17/2025 11:28 AM EDT -----  Kidney function remains diminished but it is stable with no major change.    ----- Message -----  From: Josh Colón Results In  Sent: 6/16/2025  10:25 PM EDT  To: Jorge Lynn MD

## 2025-06-17 NOTE — PROGRESS NOTES
Olayinka Kowalski   64 y.o.    @@  N/Room: [id number]/Room/bed info not found    Subjective:   The patient is being seen for a routine clinic follow-up of chronic kidney disease. Recently, the disease has been stable. Disease complications:  No hyperkalemia, no hypocalcemia, no hyperphosphatemia, no metabolic acidosis, no coagulopathy, no uremic encephalopathy, no neuropathy and no renal osteodystrophy. The patient is currently asymptomatic. No associated symptoms are reported.       Meds:   Current Medications[1]       ROS:  The patient is awake and oriented. No dizziness or lightheadedness. No chills and no fever. No headaches. No nausea and no vomiting. No shortness of breath. No cough. No chest pain. No abdominal pain. No diarrhea. No hematemesis or hemoptysis. No hematuria. No rectal bleeding. No melena. No epistaxis. No urinary symptoms. No flank pain. No leg edema. No itching. Overall, the rest of the review of systems is also negative.  12 point review of systems otherwise negative as stated in HPI.        Physical Examination:        Vitals:    06/17/25 1525   BP: 126/81   Pulse: 87     General: The patient is awake, oriented, and is not in any distress.  Head and Neck: Normocephalic. No periorbital edema.  Eyes: non-icteric  Respiratory: Symmetric chest expansion. No respiratory distress.  Skin: No maculopapular rash.  Musculoskeletal: No peripheral edema.  Neuro Exam: Speech is fluent. Moves extremities.    Imaging:  === 12/29/22 ===    US RENAL COMPLETE    - Impression -  No hydronephrosis seen bilaterally.    Right renal parapelvic cyst measuring up to 1.1 x 1.1 cm.    Enlarged, heterogeneous prostate.       Blood Labs:  No results found for this or any previous visit (from the past 24 hours).   Lab Results   Component Value Date    PTH 34 06/16/2025    PROTUR NEGATIVE 02/24/2023    PROTUR 17 02/24/2023    PHOS 3.8 09/16/2023      Lab Results   Component Value Date    GLUCOSE 112 06/16/2025    CALCIUM 9.3  06/16/2025     06/16/2025    K 4.2 06/16/2025    CO2 24 06/16/2025     06/16/2025    BUN 30 (H) 06/16/2025    CREATININE 1.60 (H) 06/16/2025             Assessment and Plan:  1 chronic kidney disease stage III. Last creatinine level is 1.6. Stable kidney function. Normal electrolytes. Normal bicarb level. Volume status is good.      2. hypertension. Blood pressure is under control. Continue the current meds.     I will see him in about 6 months for follow-up.          Jorge Lynn MD  Senior Attending Physician  Director of Onco-Nephrology Program  Division of Nephrology & Hypertension  [facility]       [1]   Current Outpatient Medications   Medication Sig Dispense Refill    allopurinol (Zyloprim) 100 mg tablet Take 1 tablet (100 mg) by mouth once daily. 90 tablet 3    atorvastatin (Lipitor) 80 mg tablet TAKE 1 TABLET DAILY (INCREASED) 90 tablet 3    EPINEPHrine 0.3 mg/0.3 mL injection syringe Inject 0.3 mL (0.3 mg) into the muscle 1 time if needed for anaphylaxis for up to 1 dose. As Directed 1 each 1    ergocalciferol (Vitamin D-2) 1.25 MG (36775 UT) capsule Take 1 capsule (1.25 mg) by mouth every 14 (fourteen) days.      magnesium 250 mg tablet Take 1 tablet (250 mg) by mouth once daily at bedtime. 90 tablet 3    meclizine (Antivert) 25 mg tablet Take 1 tablet (25 mg) by mouth 3 times a day as needed for dizziness for up to 10 days. 30 tablet 0    omeprazole (PriLOSEC) 40 mg DR capsule TAKE 1 CAPSULE EVERY MORNING 90 capsule 3    ramipril (Altace) 1.25 mg capsule Take 2 capsules (2.5 mg) by mouth once daily. 180 capsule 3    risankizumab-rzaa (Skyrizi) 360 mg/2.4 mL (150 mg/mL) wearable injector injection Inject 2.4 mL (360 mg) under the skin every 8 (eight) weeks. 2.4 mL 3    triamterene-hydrochlorothiazid (Maxzide-25) 37.5-25 mg tablet TAKE 1 TABLET DAILY (FOLLOW UP 10/13/21) 90 tablet 3    ubidecarenone (COQ-10 ORAL) Take 1 capsule by mouth once daily.       risankizumab-rzaa (Skyrizi) 60 mg/mL solution injection Infuse 10 mL (600 mg total) into a venous catheter every 28 (twenty-eight) days.  at week 0, 4 and 8 (Patient not taking: Reported on 6/17/2025) 10 mL 2     No current facility-administered medications for this visit.

## 2025-07-01 DIAGNOSIS — I10 ESSENTIAL HYPERTENSION: ICD-10-CM

## 2025-07-06 RX ORDER — RAMIPRIL 1.25 MG/1
2.5 CAPSULE ORAL DAILY
Qty: 180 CAPSULE | Refills: 3 | Status: SHIPPED | OUTPATIENT
Start: 2025-07-06

## 2025-07-28 ENCOUNTER — APPOINTMENT (OUTPATIENT)
Dept: PRIMARY CARE | Facility: CLINIC | Age: 65
End: 2025-07-28
Payer: COMMERCIAL

## 2025-07-28 VITALS
SYSTOLIC BLOOD PRESSURE: 124 MMHG | WEIGHT: 201 LBS | DIASTOLIC BLOOD PRESSURE: 76 MMHG | RESPIRATION RATE: 16 BRPM | OXYGEN SATURATION: 98 % | HEART RATE: 78 BPM | HEIGHT: 73 IN | BODY MASS INDEX: 26.64 KG/M2

## 2025-07-28 DIAGNOSIS — Z08 FOLLOW-UP SURVEILLANCE OF SKIN CANCER, ENCOUNTER FOR: ICD-10-CM

## 2025-07-28 DIAGNOSIS — Z87.891 HISTORY OF TOBACCO ABUSE: ICD-10-CM

## 2025-07-28 DIAGNOSIS — Z85.828 FOLLOW-UP SURVEILLANCE OF SKIN CANCER, ENCOUNTER FOR: ICD-10-CM

## 2025-07-28 DIAGNOSIS — Z91.030 BEE STING ALLERGY: ICD-10-CM

## 2025-07-28 DIAGNOSIS — Z00.00 WELL ADULT HEALTH CHECK: Primary | ICD-10-CM

## 2025-07-28 DIAGNOSIS — K50.10 CROHN'S DISEASE OF COLON WITHOUT COMPLICATION (MULTI): ICD-10-CM

## 2025-07-28 DIAGNOSIS — I10 ESSENTIAL HYPERTENSION: ICD-10-CM

## 2025-07-28 DIAGNOSIS — L82.1 SEBORRHEIC KERATOSES: ICD-10-CM

## 2025-07-28 DIAGNOSIS — N18.31 CHRONIC RENAL FAILURE, STAGE 3A (MULTI): ICD-10-CM

## 2025-07-28 DIAGNOSIS — E78.2 MIXED HYPERLIPIDEMIA: ICD-10-CM

## 2025-07-28 DIAGNOSIS — Z12.5 ENCOUNTER FOR PROSTATE CANCER SCREENING: ICD-10-CM

## 2025-07-28 PROCEDURE — 1126F AMNT PAIN NOTED NONE PRSNT: CPT | Performed by: INTERNAL MEDICINE

## 2025-07-28 PROCEDURE — 1159F MED LIST DOCD IN RCRD: CPT | Performed by: INTERNAL MEDICINE

## 2025-07-28 PROCEDURE — 3008F BODY MASS INDEX DOCD: CPT | Performed by: INTERNAL MEDICINE

## 2025-07-28 PROCEDURE — 3074F SYST BP LT 130 MM HG: CPT | Performed by: INTERNAL MEDICINE

## 2025-07-28 PROCEDURE — 99397 PER PM REEVAL EST PAT 65+ YR: CPT | Performed by: INTERNAL MEDICINE

## 2025-07-28 PROCEDURE — 3078F DIAST BP <80 MM HG: CPT | Performed by: INTERNAL MEDICINE

## 2025-07-28 RX ORDER — EPINEPHRINE 0.3 MG/.3ML
0.3 INJECTION SUBCUTANEOUS ONCE AS NEEDED
Qty: 1 EACH | Refills: 1 | Status: SHIPPED | OUTPATIENT
Start: 2025-07-28

## 2025-07-28 ASSESSMENT — PROMIS GLOBAL HEALTH SCALE
RATE_SOCIAL_SATISFACTION: EXCELLENT
RATE_AVERAGE_FATIGUE: MILD
RATE_QUALITY_OF_LIFE: EXCELLENT
EMOTIONAL_PROBLEMS: NEVER
RATE_MENTAL_HEALTH: EXCELLENT
RATE_AVERAGE_PAIN: 2
RATE_PHYSICAL_HEALTH: VERY GOOD
RATE_GENERAL_HEALTH: VERY GOOD
CARRYOUT_SOCIAL_ACTIVITIES: VERY GOOD
CARRYOUT_PHYSICAL_ACTIVITIES: COMPLETELY

## 2025-07-28 ASSESSMENT — PAIN SCALES - GENERAL: PAINLEVEL_OUTOF10: 0-NO PAIN

## 2025-07-28 ASSESSMENT — PATIENT HEALTH QUESTIONNAIRE - PHQ9
2. FEELING DOWN, DEPRESSED OR HOPELESS: NOT AT ALL
SUM OF ALL RESPONSES TO PHQ9 QUESTIONS 1 AND 2: 0
1. LITTLE INTEREST OR PLEASURE IN DOING THINGS: NOT AT ALL

## 2025-07-28 NOTE — ASSESSMENT & PLAN NOTE
Orders:    EPINEPHrine 0.3 mg/0.3 mL injection syringe; Inject 0.3 mL (0.3 mg) into the muscle 1 time if needed for anaphylaxis for up to 1 dose. As Directed

## 2025-07-28 NOTE — PROGRESS NOTES
Anca Kowalski is a 65 y.o. male who presents for Annual Exam.      History of Present Illness  The patient presents for an annual physical.    The primary reason for this visit is to review his overall health status and manage ongoing treatments. He underwent lung cancer screening on 2024. His smoking history includes periods of smoking from age 16 to 29, a nine-year cessation, and resumption at age 39 for another 13 years. He quit smoking 14 years ago, including cigars. His last chest x-ray showed no signs of his smoking history. He reports no chest pain or shortness of breath.    Smoking History and Lung Cancer Screening  - Onset: Smoking from age 16 to 29, resumed at age 39 for another 13 years, quit 14 years ago.  - Duration: Smoking history spans multiple periods, with cessation 14 years ago.  - Character: No signs of smoking history on last chest x-ray.  - Severity: No chest pain or shortness of breath.    He has not had a urine test recently. His creatinine level was checked during his last visit, and it was noted that it has remained stable since 2019. He has eliminated salt from his diet. He uses sunscreen when swimming and wears a hat for sun protection. He is due for an influenza vaccine in the fall. He is requesting a refill of his EpiPen as it has .    Crohn's Disease and Skyrizi Treatment  His calprotectin level decreased from nearly 4000 to 871, and then to less than 20 two months ago. He developed antibodies to Remicade, leading to a switch to Skyrizi. He has received three infusions of Skyrizi and administers it himself at home, which he finds convenient. The treatment appears to be effective as he has not experienced any flare-ups. His gastroenterologist, Dr. Winters, recommended a dermatology consultation due to his Crohn's disease and Skyrizi treatment, which may increase his risk of skin cancer. However, he has not been contacted by [facility]. He reports no  "changes in his skin condition over the years, although he has a few moles that have remained unchanged.  - Onset: Developed antibodies to Remicade, switched to Skyrizi.  - Duration: Received three infusions of Skyrizi.  - Character: Effective treatment with no flare-ups.  - Alleviating Factors: Administers Skyrizi at home, finds it convenient.  - Severity: No changes in skin condition, moles remain unchanged.    He is currently on omeprazole 40 mg, ramipril, allopurinol 100 mg for elevated uric acid levels, and atorvastatin 80 mg.    CPAP Use for Sleep Apnea  He saw Dr. Zeng in 06/2025, who reported excellent results with his CPAP use. He uses it daily and prefers the pillow mask over the full mask.  - Onset: Saw Dr. Zeng in 06/2025.  - Duration: Uses CPAP daily.  - Character: Prefers pillow mask over full mask.  - Severity: Excellent results with CPAP use.    Occupations: Metal   Hobbies: Golf  Diet: Eliminated salt  Tobacco: Quit smoking 14 years ago  Sleep: Uses CPAP daily      Review of Systems    Objective   /76   Pulse 78   Resp 16   Ht 1.854 m (6' 1\")   Wt 91.2 kg (201 lb)   SpO2 98%   BMI 26.52 kg/m²       Physical Exam  Constitutional:       Appearance: Normal appearance.   HENT:      Head: Normocephalic.      Right Ear: Tympanic membrane, ear canal and external ear normal.      Left Ear: Tympanic membrane, ear canal and external ear normal.      Nose: Nose normal.      Mouth/Throat:      Mouth: Mucous membranes are moist.      Pharynx: Oropharynx is clear.     Eyes:      Conjunctiva/sclera: Conjunctivae normal.       Cardiovascular:      Rate and Rhythm: Normal rate and regular rhythm.      Heart sounds: Normal heart sounds.   Pulmonary:      Effort: Pulmonary effort is normal.      Breath sounds: Normal breath sounds.   Abdominal:      General: Abdomen is flat.      Palpations: Abdomen is soft.     Musculoskeletal:         General: Normal range of motion.      Cervical back: Neck " supple.     Skin:     General: Skin is warm and dry.     Neurological:      General: No focal deficit present.      Mental Status: He is alert and oriented to person, place, and time.     Psychiatric:         Mood and Affect: Mood normal.         Assessment & Plan  Well adult health check    Orders:    Lipid Panel; Future    TSH with reflex to Free T4 if abnormal; Future    Albumin-Creatinine Ratio, Urine Random; Future    Basic metabolic panel; Future    Bee sting allergy    Orders:    EPINEPHrine 0.3 mg/0.3 mL injection syringe; Inject 0.3 mL (0.3 mg) into the muscle 1 time if needed for anaphylaxis for up to 1 dose. As Directed    History of tobacco abuse    Orders:    CT lung screening low dose; Future    Crohn's disease of colon without complication (Multi)         Follow-up surveillance of skin cancer, encounter for    Orders:    Referral to Dermatology    Encounter for prostate cancer screening    Orders:    Prostate Specific Antigen; Future    Chronic renal failure, stage 3a (Multi)    Orders:    Albumin-Creatinine Ratio, Urine Random; Future    Basic metabolic panel; Future    Essential hypertension         Mixed hyperlipidemia    Orders:    Albumin-Creatinine Ratio, Urine Random; Future    Basic metabolic panel; Future    Seborrheic keratoses    Orders:    Referral to Dermatology       Assessment & Plan  1. Annual physical.  - Kidney function remains stable.  - Referral to dermatology has been made for further evaluation of a spot on the ear, which appears to be seborrheic keratosis or sun damage.  - Blood work will be ordered to assess cholesterol levels, PSA, and thyroid function; a urine test will also be conducted to check protein and creatinine levels.  - Due for an influenza vaccine in the fall; EpiPen will be refilled.    2. Crohn's disease: Stable.  - Currently on Skyrizi with no reported flare-ups.  - Administers medication using a wearable injector, which is found convenient.  - Advised to  continue using sunscreen on arms and wear a hat for sun protection.    3. Medication management.  - Currently taking omeprazole 40 mg, ramipril, allopurinol 100 mg, and atorvastatin 80 mg.  - Advised to continue these medications as prescribed.    4. Sleep apnea: Stable.  - Using a CPAP machine daily with good results.  - Prefers using CPAP pillows over the full mask.    Follow-up  - Blood work for cholesterol levels, PSA, and thyroid function  - Urine test for protein and creatinine levels  - Influenza vaccine in the fall  - EpiPen refill      Henry Steve DO   This medical note was created with the assistance of artificial intelligence (AI) for documentation purposes. The content has been reviewed and confirmed by the healthcare provider for accuracy and completeness. Patient consented to the use of audio recording and use of AI during their visit.

## 2025-07-30 LAB
ALBUMIN/CREAT UR: 4 MG/G CREAT
ANION GAP SERPL CALCULATED.4IONS-SCNC: 11 MMOL/L (CALC) (ref 7–17)
BUN SERPL-MCNC: 22 MG/DL (ref 7–25)
BUN/CREAT SERPL: 14 (CALC) (ref 6–22)
CALCIUM SERPL-MCNC: 9.5 MG/DL (ref 8.6–10.3)
CHLORIDE SERPL-SCNC: 104 MMOL/L (ref 98–110)
CHOLEST SERPL-MCNC: 167 MG/DL
CHOLEST/HDLC SERPL: 4.2 (CALC)
CO2 SERPL-SCNC: 26 MMOL/L (ref 20–32)
CREAT SERPL-MCNC: 1.57 MG/DL (ref 0.7–1.35)
CREAT UR-MCNC: 107 MG/DL (ref 20–320)
EGFRCR SERPLBLD CKD-EPI 2021: 48 ML/MIN/1.73M2
GLUCOSE SERPL-MCNC: 113 MG/DL (ref 65–99)
HDLC SERPL-MCNC: 40 MG/DL
LDLC SERPL CALC-MCNC: 101 MG/DL (CALC)
MICROALBUMIN UR-MCNC: 0.4 MG/DL
NONHDLC SERPL-MCNC: 127 MG/DL (CALC)
POTASSIUM SERPL-SCNC: 5.1 MMOL/L (ref 3.5–5.3)
PSA SERPL-MCNC: 0.93 NG/ML
SODIUM SERPL-SCNC: 141 MMOL/L (ref 135–146)
TRIGL SERPL-MCNC: 164 MG/DL
TSH SERPL-ACNC: 0.8 MIU/L (ref 0.4–4.5)

## 2025-08-11 ENCOUNTER — APPOINTMENT (OUTPATIENT)
Dept: CARDIOLOGY | Facility: CLINIC | Age: 65
End: 2025-08-11
Payer: COMMERCIAL

## 2025-08-11 VITALS
WEIGHT: 198 LBS | SYSTOLIC BLOOD PRESSURE: 128 MMHG | HEIGHT: 73 IN | DIASTOLIC BLOOD PRESSURE: 80 MMHG | BODY MASS INDEX: 26.24 KG/M2 | HEART RATE: 64 BPM

## 2025-08-11 DIAGNOSIS — E78.49 OTHER HYPERLIPIDEMIA: Primary | ICD-10-CM

## 2025-08-11 DIAGNOSIS — I10 ESSENTIAL HYPERTENSION: ICD-10-CM

## 2025-08-11 DIAGNOSIS — I25.10 CORONARY ARTERY DISEASE INVOLVING NATIVE CORONARY ARTERY OF NATIVE HEART WITHOUT ANGINA PECTORIS: ICD-10-CM

## 2025-08-11 PROCEDURE — 3079F DIAST BP 80-89 MM HG: CPT | Performed by: INTERNAL MEDICINE

## 2025-08-11 PROCEDURE — 1159F MED LIST DOCD IN RCRD: CPT | Performed by: INTERNAL MEDICINE

## 2025-08-11 PROCEDURE — 1160F RVW MEDS BY RX/DR IN RCRD: CPT | Performed by: INTERNAL MEDICINE

## 2025-08-11 PROCEDURE — 3074F SYST BP LT 130 MM HG: CPT | Performed by: INTERNAL MEDICINE

## 2025-08-11 PROCEDURE — 99214 OFFICE O/P EST MOD 30 MIN: CPT | Performed by: INTERNAL MEDICINE

## 2025-08-11 PROCEDURE — 3008F BODY MASS INDEX DOCD: CPT | Performed by: INTERNAL MEDICINE

## 2025-08-21 ENCOUNTER — APPOINTMENT (OUTPATIENT)
Facility: CLINIC | Age: 65
End: 2025-08-21
Payer: COMMERCIAL

## 2025-08-27 DIAGNOSIS — E78.2 MIXED HYPERLIPIDEMIA: Primary | ICD-10-CM

## 2025-08-27 RX ORDER — ATORVASTATIN CALCIUM 80 MG/1
TABLET, FILM COATED ORAL
Qty: 90 TABLET | Refills: 3 | Status: SHIPPED | OUTPATIENT
Start: 2025-08-27

## 2025-09-04 ENCOUNTER — OFFICE VISIT (OUTPATIENT)
Dept: DERMATOLOGY | Facility: CLINIC | Age: 65
End: 2025-09-04
Payer: COMMERCIAL

## 2025-09-04 DIAGNOSIS — D22.72 MELANOCYTIC NEVUS OF LEFT LOWER EXTREMITY: ICD-10-CM

## 2025-09-04 DIAGNOSIS — D22.71 MELANOCYTIC NEVI OF RIGHT LOWER LIMB, INCLUDING HIP: ICD-10-CM

## 2025-09-04 DIAGNOSIS — D22.5 MELANOCYTIC NEVI OF TRUNK: ICD-10-CM

## 2025-09-04 DIAGNOSIS — L82.1 SEBORRHEIC KERATOSIS: ICD-10-CM

## 2025-09-04 DIAGNOSIS — D22.60 MELANOCYTIC NEVI OF UNSPECIFIED UPPER LIMB, INCLUDING SHOULDER: ICD-10-CM

## 2025-09-04 DIAGNOSIS — L57.0 ACTINIC KERATOSIS: ICD-10-CM

## 2025-09-04 DIAGNOSIS — Z12.83 ENCOUNTER FOR SCREENING FOR MALIGNANT NEOPLASM OF SKIN: ICD-10-CM

## 2025-09-04 DIAGNOSIS — D18.01 HEMANGIOMA OF SKIN: ICD-10-CM

## 2025-09-04 DIAGNOSIS — L57.8 PHOTOAGING OF SKIN: Primary | ICD-10-CM

## 2025-09-04 PROCEDURE — 99203 OFFICE O/P NEW LOW 30 MIN: CPT | Performed by: DERMATOLOGY

## 2025-09-04 PROCEDURE — 17000 DESTRUCT PREMALG LESION: CPT | Performed by: DERMATOLOGY

## 2025-09-04 PROCEDURE — 17003 DESTRUCT PREMALG LES 2-14: CPT | Performed by: DERMATOLOGY

## 2025-09-04 PROCEDURE — 1036F TOBACCO NON-USER: CPT | Performed by: DERMATOLOGY

## 2025-09-04 ASSESSMENT — DERMATOLOGY QUALITY OF LIFE (QOL) ASSESSMENT
RATE HOW EMOTIONALLY BOTHERED YOU ARE BY YOUR SKIN PROBLEM (FOR EXAMPLE, WORRY, EMBARRASSMENT, FRUSTRATION): 0 - NEVER BOTHERED
RATE HOW BOTHERED YOU ARE BY SYMPTOMS OF YOUR SKIN PROBLEM (EG, ITCHING, STINGING BURNING, HURTING OR SKIN IRRITATION): 0 - NEVER BOTHERED
RATE HOW BOTHERED YOU ARE BY SYMPTOMS OF YOUR SKIN PROBLEM (EG, ITCHING, STINGING BURNING, HURTING OR SKIN IRRITATION): 0 - NEVER BOTHERED
WHAT SINGLE SKIN CONDITION LISTED BELOW IS THE PATIENT ANSWERING THE QUALITY-OF-LIFE ASSESSMENT QUESTIONS ABOUT: NONE OF THE ABOVE
RATE HOW BOTHERED YOU ARE BY EFFECTS OF YOUR SKIN PROBLEMS ON YOUR ACTIVITIES (EG, GOING OUT, ACCOMPLISHING WHAT YOU WANT, WORK ACTIVITIES OR YOUR RELATIONSHIPS WITH OTHERS): 0 - NEVER BOTHERED
DATE THE QUALITY-OF-LIFE ASSESSMENT WAS COMPLETED: 67452
RATE HOW EMOTIONALLY BOTHERED YOU ARE BY YOUR SKIN PROBLEM (FOR EXAMPLE, WORRY, EMBARRASSMENT, FRUSTRATION): 0 - NEVER BOTHERED
RATE HOW BOTHERED YOU ARE BY EFFECTS OF YOUR SKIN PROBLEMS ON YOUR ACTIVITIES (EG, GOING OUT, ACCOMPLISHING WHAT YOU WANT, WORK ACTIVITIES OR YOUR RELATIONSHIPS WITH OTHERS): 0 - NEVER BOTHERED
WHAT SINGLE SKIN CONDITION LISTED BELOW IS THE PATIENT ANSWERING THE QUALITY-OF-LIFE ASSESSMENT QUESTIONS ABOUT: NONE OF THE ABOVE

## 2025-09-04 ASSESSMENT — PATIENT GLOBAL ASSESSMENT (PGA): WHAT IS THE PGA: PATIENT GLOBAL ASSESSMENT:  2 - MILD

## 2025-10-03 ENCOUNTER — APPOINTMENT (OUTPATIENT)
Dept: OTOLARYNGOLOGY | Facility: CLINIC | Age: 65
End: 2025-10-03
Payer: COMMERCIAL

## 2025-11-17 ENCOUNTER — APPOINTMENT (OUTPATIENT)
Dept: GASTROENTEROLOGY | Facility: CLINIC | Age: 65
End: 2025-11-17
Payer: COMMERCIAL

## 2025-12-17 ENCOUNTER — APPOINTMENT (OUTPATIENT)
Dept: NEPHROLOGY | Facility: CLINIC | Age: 65
End: 2025-12-17
Payer: COMMERCIAL

## 2025-12-18 ENCOUNTER — APPOINTMENT (OUTPATIENT)
Dept: GASTROENTEROLOGY | Facility: EXTERNAL LOCATION | Age: 65
End: 2025-12-18
Payer: COMMERCIAL

## 2026-01-21 ENCOUNTER — APPOINTMENT (OUTPATIENT)
Dept: DERMATOLOGY | Facility: CLINIC | Age: 66
End: 2026-01-21
Payer: COMMERCIAL

## 2026-03-11 ENCOUNTER — APPOINTMENT (OUTPATIENT)
Dept: PRIMARY CARE | Facility: CLINIC | Age: 66
End: 2026-03-11
Payer: COMMERCIAL

## 2026-06-08 ENCOUNTER — APPOINTMENT (OUTPATIENT)
Facility: CLINIC | Age: 66
End: 2026-06-08
Payer: COMMERCIAL

## 2026-08-11 ENCOUNTER — APPOINTMENT (OUTPATIENT)
Dept: CARDIOLOGY | Facility: CLINIC | Age: 66
End: 2026-08-11
Payer: COMMERCIAL

## 2026-09-17 ENCOUNTER — APPOINTMENT (OUTPATIENT)
Dept: DERMATOLOGY | Facility: CLINIC | Age: 66
End: 2026-09-17
Payer: COMMERCIAL